# Patient Record
Sex: MALE | Race: WHITE | NOT HISPANIC OR LATINO | Employment: FULL TIME | URBAN - METROPOLITAN AREA
[De-identification: names, ages, dates, MRNs, and addresses within clinical notes are randomized per-mention and may not be internally consistent; named-entity substitution may affect disease eponyms.]

---

## 2017-04-11 DIAGNOSIS — N40.1 ENLARGED PROSTATE WITH LOWER URINARY TRACT SYMPTOMS (LUTS): ICD-10-CM

## 2017-04-25 ENCOUNTER — ALLSCRIPTS OFFICE VISIT (OUTPATIENT)
Dept: OTHER | Facility: OTHER | Age: 72
End: 2017-04-25

## 2018-01-13 VITALS
DIASTOLIC BLOOD PRESSURE: 82 MMHG | HEIGHT: 70 IN | WEIGHT: 210.5 LBS | SYSTOLIC BLOOD PRESSURE: 138 MMHG | BODY MASS INDEX: 30.14 KG/M2 | HEART RATE: 60 BPM

## 2018-04-25 DIAGNOSIS — N40.1 ENLARGED PROSTATE WITH LOWER URINARY TRACT SYMPTOMS (LUTS): ICD-10-CM

## 2018-04-25 DIAGNOSIS — Z12.5 ENCOUNTER FOR SCREENING FOR MALIGNANT NEOPLASM OF PROSTATE: ICD-10-CM

## 2018-04-26 NOTE — PROGRESS NOTES
4/27/2018    Acacia Campos [facility]  1945  [id number]        Assessment  BPH  History of Elevated PSA  History of HGPIN    Discussion  Evonne Jaramillo is a 71 y o  male being managed by Dr La  PSA is 2 03 (4 06 corrected with use of Avodart), previously 1 42 (2 4 corrected with Avodart)  We discussed the rise in PSA and recommend repeat PSA in 6 to 8 weeks  He understands that if his PSA should remain elevated we would then discuss repeat prostate biopsy, MRI of the prostate, and observation  He is otherwise doing well with no urinary complaints  He will continue to take Avodart daily  All questions answered  History of Present Illness  71 y o  male with a history of BPH, elevated PSA, and HGPIN presents today for 1 year follow up  He continues to take Avodart daily  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 No   Incomplete emptying? No   Urinary frequency? No   Urinary urgency? No   Urinary hesitancy? No   Dysuria (painful difficult urination)? No   Nocturia (waking up to use the bathroom)? Yes [1-2 times ]   Straining (having to push to go)? No   Weak stream?  No   Intermittent stream?  No   Post void dribbling? No          Past Medical History  Past Medical History:   Diagnosis Date    BPH (benign prostatic hyperplasia)        Past Surgical History  History reviewed   No pertinent surgical history  Past Family History  Family History   Problem Relation Age of Onset    Diabetes Father     Hypertension Father     Hypertension Mother     Diabetes Mother        Past Social history  Social History     Social History    Marital status: /Civil Union     Spouse name: N/A    Number of children: N/A    Years of education: N/A     Occupational History    Not on file  Social History Main Topics    Smoking status: Never Smoker    Smokeless tobacco: Never Used    Alcohol use Yes      Comment: Social     Drug use: No    Sexual activity: Not on file     Other Topics Concern    Not on file     Social History Narrative    No narrative on file       Current Medications  Current Outpatient Prescriptions   Medication Sig Dispense Refill    aspirin 81 MG tablet Take 1 tablet by mouth      dutasteride (AVODART) 0 5 mg capsule TAKE 1 CAPSULE (0 5 MG TOTAL) BY MOUTH DAILY  3    lisinopril (ZESTRIL) 10 mg tablet Take 10 mg by mouth daily  8    multivitamin (THERAGRAN) TABS Take 1 tablet by mouth       No current facility-administered medications for this visit  Allergies  No Known Allergies    Past Medical History, Social History, Family History, medications and allergies were reviewed  Vitals  Vitals:    04/27/18 0855   BP: 130/78   BP Location: Left arm   Patient Position: Sitting   Cuff Size: Adult   Pulse: 70   Weight: 90 8 kg (200 lb 3 2 oz)   Height: 5' 10" (1 778 m)       Physical Exam  Skin: warm, dry, intact  Cardiac:  Peripheral edema: negative  Pulmonary: Non-labored breathing  Abdomen: Soft, non-tender, non-distended  Musculoskeletal: AROM with no joint deformity or tenderness  Neurology: Alert and oriented    (Male):  COBY: Prostate is enlarged at 45 grams  No nodules noted        Results    Please refer to patient's chart

## 2018-04-27 ENCOUNTER — OFFICE VISIT (OUTPATIENT)
Dept: UROLOGY | Facility: AMBULATORY SURGERY CENTER | Age: 73
End: 2018-04-27
Payer: MEDICARE

## 2018-04-27 VITALS
SYSTOLIC BLOOD PRESSURE: 130 MMHG | HEIGHT: 70 IN | HEART RATE: 70 BPM | WEIGHT: 200.2 LBS | BODY MASS INDEX: 28.66 KG/M2 | DIASTOLIC BLOOD PRESSURE: 78 MMHG

## 2018-04-27 DIAGNOSIS — R97.20 ELEVATED PSA: Primary | ICD-10-CM

## 2018-04-27 PROCEDURE — 99213 OFFICE O/P EST LOW 20 MIN: CPT | Performed by: NURSE PRACTITIONER

## 2018-04-27 RX ORDER — DIPHENOXYLATE HYDROCHLORIDE AND ATROPINE SULFATE 2.5; .025 MG/1; MG/1
1 TABLET ORAL
COMMUNITY

## 2018-04-27 RX ORDER — LISINOPRIL 10 MG/1
10 TABLET ORAL DAILY
Refills: 8 | COMMUNITY
Start: 2018-03-30

## 2018-04-27 RX ORDER — DUTASTERIDE 0.5 MG/1
CAPSULE, LIQUID FILLED ORAL
Refills: 3 | COMMUNITY
Start: 2018-04-06 | End: 2019-05-03 | Stop reason: SDUPTHER

## 2018-06-05 ENCOUNTER — TELEPHONE (OUTPATIENT)
Dept: UROLOGY | Facility: AMBULATORY SURGERY CENTER | Age: 73
End: 2018-06-05

## 2018-06-05 NOTE — TELEPHONE ENCOUNTER
Patient managed by Dr Lluvia Fletcher and seen at the [place] office last month by Lluvia Fletcher, [address]  [facility] called today to inform office that patient will need to repeat his PSA since they were unable to process the specimen provided to them  Left message for patient to call office  He needs to be informed to repeat PSA  Please advise him when he calls back

## 2018-06-06 ENCOUNTER — TELEPHONE (OUTPATIENT)
Dept: UROLOGY | Facility: AMBULATORY SURGERY CENTER | Age: 73
End: 2018-06-06

## 2018-06-07 DIAGNOSIS — R97.20 ELEVATED PSA: Primary | ICD-10-CM

## 2018-06-11 ENCOUNTER — OFFICE VISIT (OUTPATIENT)
Dept: UROLOGY | Facility: AMBULATORY SURGERY CENTER | Age: 73
End: 2018-06-11
Payer: MEDICARE

## 2018-06-11 VITALS
BODY MASS INDEX: 28.35 KG/M2 | DIASTOLIC BLOOD PRESSURE: 88 MMHG | SYSTOLIC BLOOD PRESSURE: 124 MMHG | HEIGHT: 70 IN | HEART RATE: 70 BPM | WEIGHT: 198 LBS

## 2018-06-11 DIAGNOSIS — R97.20 ELEVATED PSA: Primary | ICD-10-CM

## 2018-06-11 PROCEDURE — 99213 OFFICE O/P EST LOW 20 MIN: CPT | Performed by: NURSE PRACTITIONER

## 2018-06-11 RX ORDER — CIPROFLOXACIN 500 MG/1
500 TABLET, FILM COATED ORAL EVERY 12 HOURS SCHEDULED
Qty: 6 TABLET | Refills: 0 | Status: SHIPPED | OUTPATIENT
Start: 2018-06-11 | End: 2018-06-14

## 2018-06-11 NOTE — PROGRESS NOTES
6/11/2018    Marcio Lomas [facility]  1945  [id number]        Assessment  Elevated PSA  BPH  History of HGPIN    Discussion  Jc Morrison is a 71 y o  male being managed by Dr La  His PSA remains elevated at 2 02 (4 04 corrected with use of Avodart)  His PSA last year was 1 42 (2 84 corrected with use of Avodart)  We discussed that since his PSA remains elevated further evaluation is necessary  Options include prostate biopsy versus MRI of the prostate  The patient wishes to proceed with prostate biopsy  This procedure was reviewed with the patient  Pre procedure preparation was reviewed  He will continue to take Avodart daily  All questions answered  History of Present Illness  71 y o  male with a history of BPH, elevated PSA, and HGPIN presents today for follow up  He was recently evaluated and found to have a rise in PSA from 2 4 to 4 06  He returns today with a repeat PSA  He continues to take Avodart daily  He is s/p a prostate biopsy in 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  He denies any changes his lower urinary tract symptoms  He denies any recent illness or infection  Review of Systems  Review of Systems   Constitutional: Negative  HENT: Negative  Respiratory: Negative  Cardiovascular: Negative  Gastrointestinal: Negative  Genitourinary:        As per HPI   Musculoskeletal: Negative  Skin: Negative  Neurological: Negative  Hematological: Negative  AUA SYMPTOM SCORE      Most Recent Value   AUA SYMPTOM SCORE   How often have you had a sensation of not emptying your bladder completely after you finished urinating? 0   How often have you had to urinate again less than two hours after you finished urinating? 1   How often have you found you stopped and started again several times when you urinate?  0   How often have you found it difficult to postpone urination? 0   How often have you had a weak urinary stream?  0   How often have you had to push or strain to begin urination?   0   How many times did you most typically get up to urinate from the time you went to bed at night until the time you got up in the morning? 1   Quality of Life: If you were to spend the rest of your life with your urinary condition just the way it is now, how would you feel about that?  0   AUA SYMPTOM SCORE  2          Past Medical History  Past Medical History:   Diagnosis Date    BPH (benign prostatic hyperplasia)        Past Surgical History  History reviewed  No pertinent surgical history  Past Family History  Family History   Problem Relation Age of Onset    Diabetes Father     Hypertension Father     Hypertension Mother     Diabetes Mother        Past Social history  Social History     Social History    Marital status: /Civil Union     Spouse name: N/A    Number of children: N/A    Years of education: N/A     Occupational History    Not on file  Social History Main Topics    Smoking status: Never Smoker    Smokeless tobacco: Never Used    Alcohol use Yes      Comment: Social     Drug use: No    Sexual activity: Not on file     Other Topics Concern    Not on file     Social History Narrative    No narrative on file       Current Medications  Current Outpatient Prescriptions   Medication Sig Dispense Refill    aspirin 81 MG tablet Take 1 tablet by mouth      dutasteride (AVODART) 0 5 mg capsule TAKE 1 CAPSULE (0 5 MG TOTAL) BY MOUTH DAILY  3    lisinopril (ZESTRIL) 10 mg tablet Take 10 mg by mouth daily  8    multivitamin (THERAGRAN) TABS Take 1 tablet by mouth      Psyllium (METAMUCIL FIBER PO) Take by mouth      ciprofloxacin (CIPRO) 500 mg tablet Take 1 tablet (500 mg total) by mouth every 12 (twelve) hours for 3 days 6 tablet 0     No current facility-administered medications for this visit  Allergies  No Known Allergies    Past Medical History, Social History, Family History, medications and allergies were reviewed and updated as appropriate      Vitals  Vitals: 06/11/18 0821   BP: 124/88   Pulse: 70   Weight: 89 8 kg (198 lb)   Height: 5' 10" (1 778 m)       Physical Exam  Skin: warm, dry, intact  Pulmonary: Non-labored breathing  Abdomen: Soft, non-tender, non-distended  Musculoskeletal: AROM with no joint deformity or tenderness    Neurology: Alert and oriented

## 2018-08-21 NOTE — PROGRESS NOTES
Biopsy prostate     Date/Time 8/24/2018 10:06 AM     Performed by  Shahana Byrd by DELFINA Sanchez       Consent: Verbal consent obtained  Written consent obtained  Consent given by: patient  Patient understanding: patient states understanding of the procedure being performed  Patient consent: the patient's understanding of the procedure matches consent given  Procedure consent: procedure consent matches procedure scheduled  Patient identity confirmed: verbally with patient      Local anesthesia used: yes      Anesthesia: local infiltration     Anesthesia   Local anesthesia used: yes  Local Anesthetic: lidocaine 1% without epinephrine     Sedation   Patient sedated: no      Specimen: yes   Procedure Details   Patient Transportation: confirmed  Patient tolerance: Patient tolerated the procedure well with no immediate complications         71 y o  male with a history of BPH, elevated PSA, and HGPIN presents today for follow up  He was recently evaluated and found to have a rise in PSA from 2 4 to 4 06  He returns today with a repeat PSA  He continues to take Avodart daily  He is s/p a prostate biopsy in 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  He denies any changes his lower urinary tract symptoms  He denies any recent illness or infection  PROCEDURE- US GUIDED PROSTATE BIOPSY    After identification and obtaining informed consent the patient was placed in the left lateral decubitus position in the ultrasound room  He was prepped in the usual fashion  10 cc of 1% viscous lidocaine was administered per rectum  The 7 5 MHz transrectal probe was then introduced  A periprosthetic nerve block was provided by instilling 10 cc of 1% lidocaine via spinal needle into the periprosthetic space bilaterally  Serial images of the prostate were then obtained  Once completed biopsies were retrieved 4 from each peripheral zone and 2 from each central zone for a total of 12 cores   Patient tolerated this well and will follow up in one to 2 weeks for pathology results  PERTINENT FINDINGS AND PLAN        Prostate volume is 78 cc  No significant median lobe component detected  Otherwise heterogeneous appearance with scant calcifications  Patient tolerated the biopsy procedure well

## 2018-08-23 DIAGNOSIS — R97.20 ELEVATED PSA: Primary | ICD-10-CM

## 2018-08-24 ENCOUNTER — PROCEDURE VISIT (OUTPATIENT)
Dept: UROLOGY | Facility: CLINIC | Age: 73
End: 2018-08-24
Payer: MEDICARE

## 2018-08-24 VITALS
SYSTOLIC BLOOD PRESSURE: 140 MMHG | HEART RATE: 70 BPM | HEIGHT: 70 IN | BODY MASS INDEX: 27.66 KG/M2 | WEIGHT: 193.2 LBS | DIASTOLIC BLOOD PRESSURE: 80 MMHG

## 2018-08-24 DIAGNOSIS — R97.20 ELEVATED PSA: Primary | ICD-10-CM

## 2018-08-24 PROCEDURE — 76872 US TRANSRECTAL: CPT | Performed by: UROLOGY

## 2018-08-24 PROCEDURE — G0416 PROSTATE BIOPSY, ANY MTHD: HCPCS | Performed by: PATHOLOGY

## 2018-08-24 PROCEDURE — 55700 PR BIOPSY OF PROSTATE,NEEDLE/PUNCH: CPT | Performed by: UROLOGY

## 2018-08-24 PROCEDURE — 76942 ECHO GUIDE FOR BIOPSY: CPT | Performed by: UROLOGY

## 2018-08-24 RX ORDER — LISINOPRIL 10 MG/1
TABLET ORAL
COMMUNITY
Start: 2018-07-23 | End: 2018-09-17 | Stop reason: SDUPTHER

## 2018-09-14 NOTE — PROGRESS NOTES
9/17/2018    Timmy Headings Upright  1945  [id number]    Discussion and Plan    Prostate biopsies fortunately show no evidence of adenocarcinoma  He will be maintained on Avodart  Return in 6 months with PSA prior to visit  All questions answered at this time    1  Elevated PSA  - PSA Total, Diagnostic; Future    Assessment      Patient Active Problem List   Diagnosis    Elevated PSA       History of Present Illness    Nikky Hanson is a 68 y o  male seen today in regards to a history of BPH, elevated PSA, and HGPIN presents today for follow up  He was recently evaluated and found to have a rise in PSA from 2 4 to 4 06  He returns today with a repeat PSA  He continues to take Avodart daily  He is s/p a prostate biopsy in 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  He denies any changes his lower urinary tract symptoms  He denies any recent illness or infection  He underwent repeat TRUS biopsies of the prostate  Prostate volume was 78 cc  The prostate biopsies fortunately show no evidence of adenocarcinoma  Findings reviewed with patient and wife        Urinary Symptom Assessment        Past Medical History  Past Medical History:   Diagnosis Date    BPH (benign prostatic hyperplasia)        Past Social History  History reviewed  No pertinent surgical history  Past Family History  Family History   Problem Relation Age of Onset    Diabetes Father     Hypertension Father     Hypertension Mother     Diabetes Mother        Past Social history  Social History     Social History    Marital status: /Civil Union     Spouse name: N/A    Number of children: N/A    Years of education: N/A     Occupational History    Not on file       Social History Main Topics    Smoking status: Never Smoker    Smokeless tobacco: Never Used    Alcohol use Yes      Comment: Social     Drug use: No    Sexual activity: Not on file     Other Topics Concern    Not on file     Social History Narrative    No narrative on file       Current Medications  Current Outpatient Prescriptions   Medication Sig Dispense Refill    aspirin 81 MG tablet Take 1 tablet by mouth      dutasteride (AVODART) 0 5 mg capsule TAKE 1 CAPSULE (0 5 MG TOTAL) BY MOUTH DAILY  3    lisinopril (ZESTRIL) 10 mg tablet Take 10 mg by mouth daily  8    multivitamin (THERAGRAN) TABS Take 1 tablet by mouth      Psyllium (METAMUCIL FIBER PO) Take by mouth       No current facility-administered medications for this visit  Allergies  Allergies   Allergen Reactions    Other      Other reaction(s): Unknown       Past Medical History, Social History, Family History, medications and allergies were reviewed  Review of Systems  Review of Systems   Constitutional: Negative  HENT: Negative  Eyes: Negative  Respiratory: Negative  Cardiovascular: Negative  Gastrointestinal: Negative  Endocrine: Negative  Genitourinary: Positive for decreased urine volume  Negative for difficulty urinating, hematuria and urgency  Musculoskeletal: Negative  Skin: Negative  Neurological: Negative  Hematological: Negative  Psychiatric/Behavioral: Negative  Vitals  Vitals:    09/17/18 1058   BP: (!) 150/110   Pulse: 80   Weight: 90 7 kg (200 lb)         Physical Exam    Physical Exam   Constitutional: He is oriented to person, place, and time  He appears well-developed and well-nourished  HENT:   Head: Normocephalic and atraumatic  Eyes: Pupils are equal, round, and reactive to light  Neck: Normal range of motion  Cardiovascular: Normal rate, regular rhythm and normal heart sounds  Pulmonary/Chest: Effort normal and breath sounds normal  No accessory muscle usage  No respiratory distress  Abdominal: Soft  Normal appearance and bowel sounds are normal  There is no tenderness  Musculoskeletal: Normal range of motion  Neurological: He is alert and oriented to person, place, and time  Skin: Skin is warm, dry and intact     Psychiatric: He has a normal mood and affect  His speech is normal  Cognition and memory are normal    Nursing note and vitals reviewed  Results    Below listed labs, pathology results, and radiology images were personally reviewed:    No results found for: PSA  No results found for: GLUCOSE, CALCIUM, NA, K, CO2, CL, BUN, CREATININE  No results found for: WBC, HGB, HCT, MCV, PLT    No results found for this or any previous visit (from the past 1 hour(s)) ]    Case Report   Surgical Pathology Report                         Case: [id number]                                    Authorizing Provider: Franky Neal MD             Collected:           08/24/2018 1023               Ordering Location:     [facility]        Received:            08/24/2018 1023                                      Urology [facility]                                                                Pathologist:           Gayla Bradshaw MD                                                             Specimens:   A) - Prostate, MARLY                                                                                   B) - Prostate, LCZ                                                                                   C) - Prostate, RCZ                                                                                   D) - Prostate, RPZ                                                                         Final Diagnosis   A  Prostate, left peripheral zone core biopsies:  - Benign prostatic tissue     B  Prostate, left central zone core biopsies:  - Benign prostatic tissue     C  Prostate, right central zone core biopsies:  - Benign prostatic tissue     D   Prostate, right peripheral zone core biopsies:  Benign prostatic tissue showing focal chronic inflammation           Electronically signed by Gayla Bradshaw MD on 8/27/2018 at  8:15 AM   Additional Information

## 2018-09-17 ENCOUNTER — OFFICE VISIT (OUTPATIENT)
Dept: UROLOGY | Facility: AMBULATORY SURGERY CENTER | Age: 73
End: 2018-09-17
Payer: MEDICARE

## 2018-09-17 VITALS
BODY MASS INDEX: 28.7 KG/M2 | WEIGHT: 200 LBS | SYSTOLIC BLOOD PRESSURE: 150 MMHG | DIASTOLIC BLOOD PRESSURE: 110 MMHG | HEART RATE: 80 BPM

## 2018-09-17 DIAGNOSIS — R97.20 ELEVATED PSA: Primary | ICD-10-CM

## 2018-09-17 PROCEDURE — 99214 OFFICE O/P EST MOD 30 MIN: CPT | Performed by: UROLOGY

## 2019-04-10 ENCOUNTER — OFFICE VISIT (OUTPATIENT)
Dept: UROLOGY | Facility: AMBULATORY SURGERY CENTER | Age: 74
End: 2019-04-10
Payer: MEDICARE

## 2019-04-10 VITALS
HEART RATE: 98 BPM | DIASTOLIC BLOOD PRESSURE: 74 MMHG | WEIGHT: 205.2 LBS | HEIGHT: 70 IN | BODY MASS INDEX: 29.38 KG/M2 | SYSTOLIC BLOOD PRESSURE: 132 MMHG

## 2019-04-10 DIAGNOSIS — R97.20 ELEVATED PSA: Primary | ICD-10-CM

## 2019-04-10 PROCEDURE — 99214 OFFICE O/P EST MOD 30 MIN: CPT | Performed by: UROLOGY

## 2019-05-03 DIAGNOSIS — N40.1 BENIGN PROSTATIC HYPERPLASIA WITH LOWER URINARY TRACT SYMPTOMS, SYMPTOM DETAILS UNSPECIFIED: ICD-10-CM

## 2019-05-03 DIAGNOSIS — R97.20 ELEVATED PSA: Primary | ICD-10-CM

## 2019-05-06 RX ORDER — DUTASTERIDE 0.5 MG/1
0.5 CAPSULE, LIQUID FILLED ORAL DAILY
Qty: 90 CAPSULE | Refills: 3 | Status: SHIPPED | OUTPATIENT
Start: 2019-05-06 | End: 2020-04-19 | Stop reason: SDUPTHER

## 2020-01-15 ENCOUNTER — HOSPITAL ENCOUNTER (EMERGENCY)
Facility: HOSPITAL | Age: 75
Discharge: HOME | End: 2020-01-15
Attending: EMERGENCY MEDICINE | Admitting: EMERGENCY MEDICINE
Payer: MEDICARE

## 2020-01-15 ENCOUNTER — APPOINTMENT (EMERGENCY)
Dept: CT IMAGING | Facility: HOSPITAL | Age: 75
End: 2020-01-15
Payer: MEDICARE

## 2020-01-15 VITALS
OXYGEN SATURATION: 94 % | WEIGHT: 208.11 LBS | DIASTOLIC BLOOD PRESSURE: 75 MMHG | RESPIRATION RATE: 18 BRPM | BODY MASS INDEX: 29.86 KG/M2 | SYSTOLIC BLOOD PRESSURE: 122 MMHG | TEMPERATURE: 99.8 F | HEART RATE: 121 BPM

## 2020-01-15 DIAGNOSIS — K57.92 DIVERTICULITIS: Primary | ICD-10-CM

## 2020-01-15 LAB
ALBUMIN SERPL BCP-MCNC: 3.8 G/DL (ref 3.5–5)
ALP SERPL-CCNC: 58 U/L (ref 46–116)
ALT SERPL W P-5'-P-CCNC: 23 U/L (ref 12–78)
ANION GAP SERPL CALCULATED.3IONS-SCNC: 9 MMOL/L (ref 4–13)
AST SERPL W P-5'-P-CCNC: 19 U/L (ref 5–45)
BACTERIA UR QL AUTO: ABNORMAL /HPF
BASOPHILS # BLD AUTO: 0.03 THOUSANDS/ΜL (ref 0–0.1)
BASOPHILS NFR BLD AUTO: 0 % (ref 0–1)
BILIRUB SERPL-MCNC: 1.3 MG/DL (ref 0.2–1)
BILIRUB UR QL STRIP: NEGATIVE
BUN SERPL-MCNC: 19 MG/DL (ref 5–25)
CALCIUM SERPL-MCNC: 8.9 MG/DL (ref 8.3–10.1)
CHLORIDE SERPL-SCNC: 100 MMOL/L (ref 100–108)
CLARITY UR: CLEAR
CO2 SERPL-SCNC: 25 MMOL/L (ref 21–32)
COLOR UR: YELLOW
CREAT SERPL-MCNC: 1.34 MG/DL (ref 0.6–1.3)
EOSINOPHIL # BLD AUTO: 0.15 THOUSAND/ΜL (ref 0–0.61)
EOSINOPHIL NFR BLD AUTO: 1 % (ref 0–6)
ERYTHROCYTE [DISTWIDTH] IN BLOOD BY AUTOMATED COUNT: 13.6 % (ref 11.6–15.1)
GFR SERPL CREATININE-BSD FRML MDRD: 52 ML/MIN/1.73SQ M
GLUCOSE SERPL-MCNC: 99 MG/DL (ref 65–140)
GLUCOSE UR STRIP-MCNC: NEGATIVE MG/DL
HCT VFR BLD AUTO: 51.3 % (ref 36.5–49.3)
HGB BLD-MCNC: 16.5 G/DL (ref 12–17)
HGB UR QL STRIP.AUTO: NEGATIVE
HOLD SPECIMEN: NORMAL
IMM GRANULOCYTES # BLD AUTO: 0.08 THOUSAND/UL (ref 0–0.2)
IMM GRANULOCYTES NFR BLD AUTO: 1 % (ref 0–2)
KETONES UR STRIP-MCNC: ABNORMAL MG/DL
LEUKOCYTE ESTERASE UR QL STRIP: NEGATIVE
LIPASE SERPL-CCNC: 102 U/L (ref 73–393)
LYMPHOCYTES # BLD AUTO: 1.63 THOUSANDS/ΜL (ref 0.6–4.47)
LYMPHOCYTES NFR BLD AUTO: 10 % (ref 14–44)
MCH RBC QN AUTO: 30.3 PG (ref 26.8–34.3)
MCHC RBC AUTO-ENTMCNC: 32.2 G/DL (ref 31.4–37.4)
MCV RBC AUTO: 94 FL (ref 82–98)
MONOCYTES # BLD AUTO: 1.15 THOUSAND/ΜL (ref 0.17–1.22)
MONOCYTES NFR BLD AUTO: 7 % (ref 4–12)
MUCOUS THREADS UR QL AUTO: ABNORMAL
NEUTROPHILS # BLD AUTO: 13.31 THOUSANDS/ΜL (ref 1.85–7.62)
NEUTS SEG NFR BLD AUTO: 81 % (ref 43–75)
NITRITE UR QL STRIP: NEGATIVE
NON-SQ EPI CELLS URNS QL MICRO: ABNORMAL /HPF
NRBC BLD AUTO-RTO: 0 /100 WBCS
PH UR STRIP.AUTO: 5.5 [PH]
PLATELET # BLD AUTO: 149 THOUSANDS/UL (ref 149–390)
PMV BLD AUTO: 11.2 FL (ref 8.9–12.7)
POTASSIUM SERPL-SCNC: 4.6 MMOL/L (ref 3.5–5.3)
PROT SERPL-MCNC: 7.9 G/DL (ref 6.4–8.2)
PROT UR STRIP-MCNC: ABNORMAL MG/DL
RBC # BLD AUTO: 5.45 MILLION/UL (ref 3.88–5.62)
RBC #/AREA URNS AUTO: ABNORMAL /HPF
SODIUM SERPL-SCNC: 134 MMOL/L (ref 136–145)
SP GR UR STRIP.AUTO: 1.02 (ref 1–1.03)
UROBILINOGEN UR QL STRIP.AUTO: 0.2 E.U./DL
WBC # BLD AUTO: 16.35 THOUSAND/UL (ref 4.31–10.16)
WBC #/AREA URNS AUTO: ABNORMAL /HPF

## 2020-01-15 PROCEDURE — 80053 COMPREHEN METABOLIC PANEL: CPT | Performed by: EMERGENCY MEDICINE

## 2020-01-15 PROCEDURE — 85025 COMPLETE CBC W/AUTO DIFF WBC: CPT | Performed by: EMERGENCY MEDICINE

## 2020-01-15 PROCEDURE — 99284 EMERGENCY DEPT VISIT MOD MDM: CPT

## 2020-01-15 PROCEDURE — 96360 HYDRATION IV INFUSION INIT: CPT

## 2020-01-15 PROCEDURE — 83690 ASSAY OF LIPASE: CPT | Performed by: EMERGENCY MEDICINE

## 2020-01-15 PROCEDURE — 99284 EMERGENCY DEPT VISIT MOD MDM: CPT | Performed by: EMERGENCY MEDICINE

## 2020-01-15 PROCEDURE — 36415 COLL VENOUS BLD VENIPUNCTURE: CPT

## 2020-01-15 PROCEDURE — 74177 CT ABD & PELVIS W/CONTRAST: CPT

## 2020-01-15 PROCEDURE — 81001 URINALYSIS AUTO W/SCOPE: CPT | Performed by: EMERGENCY MEDICINE

## 2020-01-15 RX ORDER — METOPROLOL SUCCINATE 100 MG/1
TABLET, EXTENDED RELEASE ORAL
COMMUNITY
Start: 2020-01-13

## 2020-01-15 RX ORDER — AMOXICILLIN AND CLAVULANATE POTASSIUM 875; 125 MG/1; MG/1
1 TABLET, FILM COATED ORAL ONCE
Status: COMPLETED | OUTPATIENT
Start: 2020-01-15 | End: 2020-01-15

## 2020-01-15 RX ORDER — AMOXICILLIN AND CLAVULANATE POTASSIUM 875; 125 MG/1; MG/1
1 TABLET, FILM COATED ORAL EVERY 12 HOURS
Qty: 20 TABLET | Refills: 0 | Status: SHIPPED | OUTPATIENT
Start: 2020-01-15 | End: 2020-01-25

## 2020-01-15 RX ADMIN — SODIUM CHLORIDE 1000 ML: 0.9 INJECTION, SOLUTION INTRAVENOUS at 19:24

## 2020-01-15 RX ADMIN — AMOXICILLIN AND CLAVULANATE POTASSIUM 1 TABLET: 875; 125 TABLET, FILM COATED ORAL at 21:18

## 2020-01-15 RX ADMIN — IOHEXOL 100 ML: 350 INJECTION, SOLUTION INTRAVENOUS at 20:21

## 2020-01-16 NOTE — ED PROVIDER NOTES
History  Chief Complaint   Patient presents with    Abdominal Pain     reports LLQ abdmoinal pain x2 days, c/o generalized body aches, pain with walking, -n/v/d/fever  68 yo male with hx of diverticulitis, resection by Dr Dee Kolb 20 years ago and has had no symptoms since that time until monday when he began with LLQ abd pain  No NVD, "not as regular" BM past few days  History provided by:  Patient   used: No    Abdominal Pain   Pain location:  LLQ  Pain quality: aching    Pain radiates to:  Does not radiate  Pain severity:  Moderate  Onset quality:  Gradual  Duration:  3 days  Timing:  Constant  Progression:  Waxing and waning  Chronicity:  New  Relieved by:  Nothing  Worsened by: Movement and palpation  Ineffective treatments:  None tried  Associated symptoms: no anorexia, no chest pain, no chills, no constipation, no diarrhea, no dysuria, no fatigue, no fever, no nausea, no shortness of breath, no sore throat and no vomiting        Prior to Admission Medications   Prescriptions Last Dose Informant Patient Reported? Taking?    Psyllium (METAMUCIL FIBER PO)  Self Yes No   Sig: Take by mouth   dutasteride (AVODART) 0 5 mg capsule   No No   Sig: Take 1 capsule (0 5 mg total) by mouth daily   lisinopril (ZESTRIL) 10 mg tablet  Self Yes No   Sig: Take 10 mg by mouth daily   metoprolol succinate (TOPROL-XL) 100 mg 24 hr tablet   Yes Yes   Sig: TAKE 1 TABLET BY MOUTH EVERY DAY   multivitamin (THERAGRAN) TABS  Self Yes No   Sig: Take 1 tablet by mouth   rivaroxaban (XARELTO) 20 mg tablet   Yes Yes   Sig: TAKE 1 TABLET BY MOUTH EVERY DAY WITH DINNER      Facility-Administered Medications: None       Past Medical History:   Diagnosis Date    BPH (benign prostatic hyperplasia)     Diverticulitis        Past Surgical History:   Procedure Laterality Date    BOWEL RESECTION         Family History   Problem Relation Age of Onset    Diabetes Father     Hypertension Father     Hypertension Mother     Diabetes Mother      I have reviewed and agree with the history as documented  Social History     Tobacco Use    Smoking status: Never Smoker    Smokeless tobacco: Never Used   Substance Use Topics    Alcohol use: Yes     Comment: Social     Drug use: No        Review of Systems   Constitutional: Negative for chills, fatigue and fever  HENT: Negative for congestion and sore throat  Eyes: Negative for visual disturbance  Respiratory: Negative for shortness of breath and wheezing  Cardiovascular: Negative for chest pain and palpitations  Gastrointestinal: Positive for abdominal pain (LLQ)  Negative for anorexia, constipation, diarrhea, nausea and vomiting  Genitourinary: Negative for dysuria  Musculoskeletal: Negative for neck pain and neck stiffness  Skin: Negative for pallor and rash  Neurological: Negative for headaches  Psychiatric/Behavioral: Negative for confusion  All other systems reviewed and are negative  Physical Exam  Physical Exam   Constitutional: He is oriented to person, place, and time  He appears well-developed and well-nourished  No distress  HENT:   Head: Normocephalic and atraumatic  Right Ear: External ear normal    Left Ear: External ear normal    Mouth/Throat: Oropharynx is clear and moist    Eyes: EOM are normal    Neck: Neck supple  Cardiovascular: Regular rhythm  Tachycardia present  No murmur heard  Pulmonary/Chest: Effort normal and breath sounds normal    Abdominal: Soft  Bowel sounds are normal  He exhibits no distension  There is tenderness (moderate LLQ tenderness, mild rest of lower and LUQ) in the left lower quadrant  Musculoskeletal: Normal range of motion  He exhibits no edema  Neurological: He is alert and oriented to person, place, and time  Skin: Skin is warm  No rash noted  No pallor  Psychiatric: He has a normal mood and affect  His behavior is normal    Nursing note and vitals reviewed        Vital Signs  ED Triage Vitals   Temperature Pulse Respirations Blood Pressure SpO2   01/15/20 1714 01/15/20 1714 01/15/20 1714 01/15/20 1716 01/15/20 1716   99 8 °F (37 7 °C) (!) 114 18 131/93 95 %      Temp Source Heart Rate Source Patient Position - Orthostatic VS BP Location FiO2 (%)   01/15/20 1714 01/15/20 1930 01/15/20 1930 01/15/20 1930 --   Oral Monitor Lying Left arm       Pain Score       01/15/20 1714       6           Vitals:    01/15/20 1714 01/15/20 1716 01/15/20 1930 01/15/20 2000   BP:  131/93 122/75 122/75   Pulse: (!) 114  (!) 123 (!) 121   Patient Position - Orthostatic VS:   Lying Lying         Visual Acuity      ED Medications  Medications   sodium chloride 0 9 % bolus 1,000 mL (0 mL Intravenous Stopped 1/15/20 2024)   iohexol (OMNIPAQUE) 350 MG/ML injection (MULTI-DOSE) 100 mL (100 mL Intravenous Given 1/15/20 2021)   amoxicillin-clavulanate (AUGMENTIN) 875-125 mg per tablet 1 tablet (1 tablet Oral Given 1/15/20 2118)       Diagnostic Studies  Results Reviewed     Procedure Component Value Units Date/Time    Urine Microscopic [[id number]]  (Abnormal) Collected:  01/15/20 1924    Lab Status:  Final result Specimen:  Urine, Clean Catch Updated:  01/15/20 1949     RBC, UA None Seen /hpf      WBC, UA 0-1 /hpf      Epithelial Cells None Seen /hpf      Bacteria, UA Occasional /hpf      MUCUS THREADS Moderate    UA w Reflex to Microscopic w Reflex to Culture [[id number]]  (Abnormal) Collected:  01/15/20 1924    Lab Status:  Final result Specimen:  Urine, Clean Catch Updated:  01/15/20 1933     Color, UA Yellow     Clarity, UA Clear     Specific Gravity, UA 1 025     pH, UA 5 5     Leukocytes, UA Negative     Nitrite, UA Negative     Protein, UA Trace mg/dl      Glucose, UA Negative mg/dl      Ketones, UA Trace mg/dl      Urobilinogen, UA 0 2 E U /dl      Bilirubin, UA Negative     Blood, UA Negative    POCT urinalysis dipstick [[id number]]     Lab Status:  No result Specimen:  Urine     Comprehensive metabolic panel [[id number]]  (Abnormal) Collected:  01/15/20 1726    Lab Status:  Final result Specimen:  Blood from Arm, Left Updated:  01/15/20 1748     Sodium 134 mmol/L      Potassium 4 6 mmol/L      Chloride 100 mmol/L      CO2 25 mmol/L      ANION GAP 9 mmol/L      BUN 19 mg/dL      Creatinine 1 34 mg/dL      Glucose 99 mg/dL      Calcium 8 9 mg/dL      AST 19 U/L      ALT 23 U/L      Alkaline Phosphatase 58 U/L      Total Protein 7 9 g/dL      Albumin 3 8 g/dL      Total Bilirubin 1 30 mg/dL      eGFR 52 ml/min/1 73sq m     Narrative:       National Kidney Disease Foundation guidelines for Chronic Kidney Disease (CKD):     Stage 1 with normal or high GFR (GFR > 90 mL/min/1 73 square meters)    Stage 2 Mild CKD (GFR = 60-89 mL/min/1 73 square meters)    Stage 3A Moderate CKD (GFR = 45-59 mL/min/1 73 square meters)    Stage 3B Moderate CKD (GFR = 30-44 mL/min/1 73 square meters)    Stage 4 Severe CKD (GFR = 15-29 mL/min/1 73 square meters)    Stage 5 End Stage CKD (GFR <15 mL/min/1 73 square meters)  Note: GFR calculation is accurate only with a steady state creatinine    Lipase [[id number]]  (Normal) Collected:  01/15/20 1726    Lab Status:  Final result Specimen:  Blood from Arm, Left Updated:  01/15/20 1748     Lipase 102 u/L     CBC and differential [[id number]]  (Abnormal) Collected:  01/15/20 1726    Lab Status:  Final result Specimen:  Blood from Arm, Left Updated:  01/15/20 1731     WBC 16 35 Thousand/uL      RBC 5 45 Million/uL      Hemoglobin 16 5 g/dL      Hematocrit 51 3 %      MCV 94 fL      MCH 30 3 pg      MCHC 32 2 g/dL      RDW 13 6 %      MPV 11 2 fL      Platelets 714 Thousands/uL      nRBC 0 /100 WBCs      Neutrophils Relative 81 %      Immat GRANS % 1 %      Lymphocytes Relative 10 %      Monocytes Relative 7 %      Eosinophils Relative 1 %      Basophils Relative 0 %      Neutrophils Absolute 13 31 Thousands/µL      Immature Grans Absolute 0 08 Thousand/uL      Lymphocytes Absolute 1 63 Thousands/µL Monocytes Absolute 1 15 Thousand/µL      Eosinophils Absolute 0 15 Thousand/µL      Basophils Absolute 0 03 Thousands/µL                  CT abdomen pelvis with contrast   Final Result by Elvin Block MD (01/15 2047)         1  Acute left lower quadrant diverticulitis involving the descending colon  No abscess, perforation, or bowel obstruction  2   Bibasilar atelectasis  Workstation performed: [id number]                    Procedures  Procedures         ED Course  ED Course as of Александр 15 2204   Wed Александр 15, 2020   1900 Pt seen and examined  70 yo male with hx of diverticulitis, resection by Dr Angel Magallanes 20 years ago and has had no symptoms since that time until monday when he began with LLQ abd pain  No NVD, "not as regular" BM past few days  WBC 16 35, creat 1 34 - will give IVF and CT a/p r/o diverticulitis and check urine  2101 CT a/p - 1  Acute left lower quadrant diverticulitis involving the descending colon  No abscess, perforation, or bowel obstruction  2   Bibasilar atelectasis  Identification of Seniors at Risk      Most Recent Value   (ISAR) Identification of Seniors at Risk   Before the illness or injury that brought you to the Emergency, did you need someone to help you on a regular basis? 0 Filed at: 01/15/2020 1716   In the last 24 hours, have you needed more help than usual?  0 Filed at: 01/15/2020 1716   Have you been hospitalized for one or more nights during the past 6 months? 0 Filed at: 01/15/2020 1716   In general, do you see well?  0 Filed at: 01/15/2020 1716   In general, do you have serious problems with your memory?   0 Filed at: 01/15/2020 1716   Do you take more than three different medications every day?  0 Filed at: 01/15/2020 1716   ISAR Score  0 Filed at: 01/15/2020 1716                          MDM      Disposition  Final diagnoses:   Diverticulitis     Time reflects when diagnosis was documented in both MDM as applicable and the Disposition within this note     Time User Action Codes Description Comment    1/15/2020  9:09 PM Michelle Childs Add [K57 92] Diverticulitis       ED Disposition     ED Disposition Condition Date/Time Comment    Discharge Stable Wed Александр 15, 2020  9:09 PM Rod Mohamud Upright discharge to home/self care  Follow-up Information     Follow up With Specialties Details Why Contact Info    Willistine Severance, MD Internal Medicine Schedule an appointment as soon as possible for a visit  As needed [facility] [address]  [phone]            Discharge Medication List as of 1/15/2020  9:13 PM      START taking these medications    Details   amoxicillin-clavulanate (AUGMENTIN) 875-125 mg per tablet Take 1 tablet by mouth every 12 (twelve) hours for 10 days, Starting Wed 1/15/2020, Until Sat 1/25/2020, Print         CONTINUE these medications which have NOT CHANGED    Details   metoprolol succinate (TOPROL-XL) 100 mg 24 hr tablet TAKE 1 TABLET BY MOUTH EVERY DAY, Historical Med      rivaroxaban (XARELTO) 20 mg tablet TAKE 1 TABLET BY MOUTH EVERY DAY WITH DINNER, Historical Med      dutasteride (AVODART) 0 5 mg capsule Take 1 capsule (0 5 mg total) by mouth daily, Starting Mon 5/6/2019, Normal      lisinopril (ZESTRIL) 10 mg tablet Take 10 mg by mouth daily, Starting Fri 3/30/2018, Historical Med      multivitamin (THERAGRAN) TABS Take 1 tablet by mouth, Historical Med      Psyllium (METAMUCIL FIBER PO) Take by mouth, Historical Med           No discharge procedures on file      ED Provider  Electronically Signed by           Carey Gilman DO  01/15/20 5609 No/HCP given and explained.

## 2020-04-09 ENCOUNTER — TELEPHONE (OUTPATIENT)
Dept: UROLOGY | Facility: AMBULATORY SURGERY CENTER | Age: 75
End: 2020-04-09

## 2020-04-19 DIAGNOSIS — N40.1 BENIGN PROSTATIC HYPERPLASIA WITH LOWER URINARY TRACT SYMPTOMS, SYMPTOM DETAILS UNSPECIFIED: ICD-10-CM

## 2020-04-19 DIAGNOSIS — R97.20 ELEVATED PSA: ICD-10-CM

## 2020-04-19 RX ORDER — DUTASTERIDE 0.5 MG/1
0.5 CAPSULE, LIQUID FILLED ORAL DAILY
Qty: 90 CAPSULE | Refills: 3 | Status: SHIPPED | OUTPATIENT
Start: 2020-04-19 | End: 2021-01-18

## 2020-04-20 ENCOUNTER — TELEMEDICINE (OUTPATIENT)
Dept: UROLOGY | Facility: AMBULATORY SURGERY CENTER | Age: 75
End: 2020-04-20
Payer: MEDICARE

## 2020-04-20 DIAGNOSIS — R97.20 ELEVATED PSA: Primary | ICD-10-CM

## 2020-04-20 PROCEDURE — 99214 OFFICE O/P EST MOD 30 MIN: CPT | Performed by: NURSE PRACTITIONER

## 2021-01-18 DIAGNOSIS — N40.1 BENIGN PROSTATIC HYPERPLASIA WITH LOWER URINARY TRACT SYMPTOMS, SYMPTOM DETAILS UNSPECIFIED: ICD-10-CM

## 2021-01-18 DIAGNOSIS — R97.20 ELEVATED PSA: ICD-10-CM

## 2021-01-18 RX ORDER — FINASTERIDE 5 MG/1
TABLET, FILM COATED ORAL
Qty: 90 TABLET | Refills: 0 | Status: SHIPPED | OUTPATIENT
Start: 2021-01-18 | End: 2021-04-16

## 2021-03-05 DIAGNOSIS — Z23 ENCOUNTER FOR IMMUNIZATION: ICD-10-CM

## 2021-04-16 DIAGNOSIS — N40.1 BENIGN PROSTATIC HYPERPLASIA WITH LOWER URINARY TRACT SYMPTOMS, SYMPTOM DETAILS UNSPECIFIED: ICD-10-CM

## 2021-04-16 DIAGNOSIS — R97.20 ELEVATED PSA: ICD-10-CM

## 2021-04-16 RX ORDER — FINASTERIDE 5 MG/1
TABLET, FILM COATED ORAL
Qty: 90 TABLET | Refills: 0 | Status: SHIPPED | OUTPATIENT
Start: 2021-04-16 | End: 2021-04-26 | Stop reason: SDUPTHER

## 2021-04-26 DIAGNOSIS — N40.1 BENIGN PROSTATIC HYPERPLASIA WITH LOWER URINARY TRACT SYMPTOMS, SYMPTOM DETAILS UNSPECIFIED: ICD-10-CM

## 2021-04-26 DIAGNOSIS — R97.20 ELEVATED PSA: ICD-10-CM

## 2021-04-26 RX ORDER — FINASTERIDE 5 MG/1
5 TABLET, FILM COATED ORAL DAILY
Qty: 90 TABLET | Refills: 0 | Status: SHIPPED | OUTPATIENT
Start: 2021-04-26 | End: 2021-06-15 | Stop reason: SDUPTHER

## 2021-04-26 NOTE — TELEPHONE ENCOUNTER
The patient has an upcoming office visit scheduled for 5/17/21 with Brett Boland PA-C in the \[facility]\"" location but will run out of medication until then    Request for same, 90 day supply with NO refills was queued and forwarded to the Advanced Practitioner covering the \[facility]\"" location for approval

## 2021-05-12 RX ORDER — MONTELUKAST SODIUM 10 MG/1
TABLET ORAL
COMMUNITY
Start: 2021-02-06

## 2021-05-12 RX ORDER — ATORVASTATIN CALCIUM 10 MG/1
TABLET, FILM COATED ORAL
COMMUNITY
Start: 2021-03-01

## 2021-05-12 RX ORDER — LISINOPRIL 5 MG/1
5 TABLET ORAL DAILY
COMMUNITY
Start: 2021-02-26

## 2021-06-14 NOTE — PROGRESS NOTES
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  - S/p negative prostate biopsy in 2018  - Most recent PSA was 1 30, 2 6 finasteride corrected  - COBY unremarkable  - Given patient's history of elevated PSA and good functional and health status, will repeat PSA in 1 year  If stable and within normal limits consider discontinuing screening per AUA guidelines  History of Present Illness  Laura Burkett is a 77 y o  male here for follow up evaluation of    BPH and history of elevated PSA  Patient has 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 0   How often have you had to urinate again less than two hours after you finished urinating? 1   How often have you found you stopped and started again several times when you urinate?  0   How often have you found it difficult to postpone urination? 0   How often have you had a weak urinary stream?  0   How often have you had to push or strain to begin urination? 0   How many times did you most typically get up to urinate from the time you went to bed at night until the time you got up in the morning?   1   Quality of Life: If you were to spend the rest of your life with your urinary condition just the way it is now, how would you feel about that?  1   AUA SYMPTOM SCORE  2          Review of Systems   Constitutional: Negative for chills and fever  Respiratory: Negative for shortness of breath  Cardiovascular: Negative for chest pain  Gastrointestinal: Negative for abdominal pain, constipation, diarrhea, nausea and vomiting  Genitourinary: Negative for difficulty urinating, dysuria, flank pain, frequency, hematuria and urgency  Neurological: Negative for dizziness  Past Medical History  Past Medical History:   Diagnosis Date    BPH (benign prostatic hyperplasia)     Diverticulitis        Past Social History  Past Surgical History:   Procedure Laterality Date    BOWEL RESECTION       Social History     Tobacco Use   Smoking Status Never Smoker   Smokeless Tobacco Never Used       Past Family History  Family History   Problem Relation Age of Onset    Diabetes Father     Hypertension Father     Hypertension Mother     Diabetes Mother        Past Social history  Social History     Socioeconomic History    Marital status: /Civil Union     Spouse name: Not on file    Number of children: Not on file    Years of education: Not on file    Highest education level: Not on file   Occupational History    Not on file   Tobacco Use    Smoking status: Never Smoker    Smokeless tobacco: Never Used   Substance and Sexual Activity    Alcohol use: Yes     Comment: Social     Drug use: No    Sexual activity: Not on file   Other Topics Concern    Not on file   Social History Narrative    Not on file     Social Determinants of Health     Financial Resource Strain:     Difficulty of Paying Living Expenses:    Food Insecurity:     Worried About Running Out of Food in the Last Year:     Ran Out of Food in the Last Year:    Transportation Needs:     Lack of Transportation (Medical):      Lack of Transportation (Non-Medical):    Physical Activity:     Days of Exercise per Week:     Minutes of Exercise per Session:    Stress:     Feeling of Stress :    Social Connections:     Frequency of Communication with Friends and Family:     Frequency of Social Gatherings with Friends and Family:     Attends Orthodox Services:     Active Member of Clubs or Organizations:     Attends Club or Organization Meetings:     Marital Status:    Intimate Partner Violence:     Fear of Current or Ex-Partner:     Emotionally Abused:     Physically Abused:     Sexually Abused:        Current Medications  Current Outpatient Medications   Medication Sig Dispense Refill    atorvastatin (LIPITOR) 10 mg tablet TAKE 1 TABLET BY MOUTH EVERY DAY AT NIGHT      finasteride (PROSCAR) 5 mg tablet Take 1 tablet (5 mg total) by mouth daily 90 tablet 0    lisinopril (ZESTRIL) 10 mg tablet Take 10 mg by mouth daily  8    lisinopril (ZESTRIL) 5 mg tablet Take 5 mg by mouth daily      metoprolol succinate (TOPROL-XL) 100 mg 24 hr tablet TAKE 1 TABLET BY MOUTH EVERY DAY      multivitamin (THERAGRAN) TABS Take 1 tablet by mouth      Psyllium (METAMUCIL FIBER PO) Take by mouth      rivaroxaban (XARELTO) 20 mg tablet TAKE 1 TABLET BY MOUTH EVERY DAY WITH DINNER      montelukast (SINGULAIR) 10 mg tablet TAKE 1 TABLET BY MOUTH EVERY DAY AT NIGHT       No current facility-administered medications for this visit  Allergies  No Known Allergies      The following portions of the patient's history were reviewed and updated as appropriate: allergies, current medications, past medical history, past social history, past surgical history and problem list       Vitals  Vitals:    06/15/21 0821   BP: 120/58   BP Location: Left arm   Patient Position: Sitting   Cuff Size: Adult   Pulse: 82   Weight: 92 1 kg (203 lb)   Height: 5' 9" (1 753 m)           Physical Exam  Physical Exam  Constitutional:       Appearance: Normal appearance  HENT:      Head: Normocephalic and atraumatic        Right Ear: External ear normal       Left Ear: External ear normal    Eyes:      General: No scleral icterus  Conjunctiva/sclera: Conjunctivae normal    Cardiovascular:      Pulses: Normal pulses  Pulmonary:      Effort: Pulmonary effort is normal    Genitourinary:     Comments: No prostate nodules or tenderness  Musculoskeletal:         General: Normal range of motion  Cervical back: Normal range of motion  Skin:     General: Skin is warm and dry  Neurological:      General: No focal deficit present  Mental Status: He is alert and oriented to person, place, and time  Psychiatric:         Mood and Affect: Mood normal          Behavior: Behavior normal          Thought Content:  Thought content normal          Judgment: Judgment normal            Results  Recent Results (from the past 1 hour(s))   POCT Measure PVR    Collection Time: 06/15/21  8:28 AM   Result Value Ref Range    POST-VOID RESIDUAL VOLUME, ML POC 17 mL   ]  No results found for: PSA  Lab Results   Component Value Date    CALCIUM 8 9 01/15/2020    K 4 6 01/15/2020    CO2 25 01/15/2020     01/15/2020    BUN 19 01/15/2020    CREATININE 1 34 (H) 01/15/2020     Lab Results   Component Value Date    WBC 16 35 (H) 01/15/2020    HGB 16 5 01/15/2020    HCT 51 3 (H) 01/15/2020    MCV 94 01/15/2020     01/15/2020           Orders  Orders Placed This Encounter   Procedures    POCT Measure PVR       Lynette Pat PA-C

## 2021-06-15 ENCOUNTER — OFFICE VISIT (OUTPATIENT)
Dept: UROLOGY | Facility: CLINIC | Age: 76
End: 2021-06-15
Payer: MEDICARE

## 2021-06-15 VITALS
HEART RATE: 82 BPM | BODY MASS INDEX: 30.07 KG/M2 | WEIGHT: 203 LBS | SYSTOLIC BLOOD PRESSURE: 120 MMHG | DIASTOLIC BLOOD PRESSURE: 58 MMHG | HEIGHT: 69 IN

## 2021-06-15 DIAGNOSIS — R97.20 ELEVATED PSA: ICD-10-CM

## 2021-06-15 DIAGNOSIS — N40.1 BENIGN PROSTATIC HYPERPLASIA WITH LOWER URINARY TRACT SYMPTOMS, SYMPTOM DETAILS UNSPECIFIED: Primary | ICD-10-CM

## 2021-06-15 DIAGNOSIS — Z12.5 SCREENING FOR PROSTATE CANCER: ICD-10-CM

## 2021-06-15 LAB — POST-VOID RESIDUAL VOLUME, ML POC: 17 ML

## 2021-06-15 PROCEDURE — 51798 US URINE CAPACITY MEASURE: CPT | Performed by: PHYSICIAN ASSISTANT

## 2021-06-15 PROCEDURE — 99213 OFFICE O/P EST LOW 20 MIN: CPT | Performed by: PHYSICIAN ASSISTANT

## 2021-06-15 RX ORDER — FINASTERIDE 5 MG/1
5 TABLET, FILM COATED ORAL DAILY
Qty: 90 TABLET | Refills: 0 | Status: SHIPPED | OUTPATIENT
Start: 2021-06-15 | End: 2021-07-22 | Stop reason: SDUPTHER

## 2021-07-22 DIAGNOSIS — N40.1 BENIGN PROSTATIC HYPERPLASIA WITH LOWER URINARY TRACT SYMPTOMS, SYMPTOM DETAILS UNSPECIFIED: ICD-10-CM

## 2021-07-22 RX ORDER — FINASTERIDE 5 MG/1
5 TABLET, FILM COATED ORAL DAILY
Qty: 90 TABLET | Refills: 0 | Status: SHIPPED | OUTPATIENT
Start: 2021-07-22 | End: 2021-10-25 | Stop reason: SDUPTHER

## 2021-10-25 DIAGNOSIS — N40.1 BENIGN PROSTATIC HYPERPLASIA WITH LOWER URINARY TRACT SYMPTOMS, SYMPTOM DETAILS UNSPECIFIED: ICD-10-CM

## 2021-10-26 RX ORDER — FINASTERIDE 5 MG/1
5 TABLET, FILM COATED ORAL DAILY
Qty: 90 TABLET | Refills: 3 | Status: SHIPPED | OUTPATIENT
Start: 2021-10-26

## 2022-06-14 NOTE — PROGRESS NOTES
6/15/2022      Chief Complaint   Patient presents with    Elevated PSA    Benign Prostatic Hypertrophy         Assessment and Plan    77 y o  male     1  History of elevated PSA  - PSA 1 34, stable  - Discussed recommendations to discontinue prostate cancer screening at this time per AUA guidelines    2  BPH with LUTS  - doing well with finasteride monotherapy  - can have this refilled with primary care  - follow-up with Urology on an as-needed basis  - call with any questions or concerns in the meantime  - All questions answered; patient understands and agrees with plan      History of Present Illness  Slim Burkett is a 77 y o  male patient with history of elevated PSA and BPH with lower urinary tract symptoms  here for follow up  Most recent PSA 1 34, stable  Patient doing well overall without any new or worsening symptoms  Patient using finasteride monotherapy without side effects  Overall happy with urination at this time  Denies frequency, urgency, dysuria, gross hematuria, flank pain, suprapubic pain, fevers, chills  Review of Systems   Constitutional: Negative for activity change, appetite change, chills and fever  HENT: Negative for congestion and trouble swallowing  Respiratory: Negative for cough and shortness of breath  Cardiovascular: Negative for chest pain, palpitations and leg swelling  Gastrointestinal: Negative for abdominal pain, constipation, diarrhea, nausea and vomiting  Genitourinary: Negative for difficulty urinating, dysuria, flank pain, frequency, hematuria and urgency  Musculoskeletal: Negative for back pain and gait problem  Skin: Negative for wound  Allergic/Immunologic: Negative for immunocompromised state  Neurological: Negative for dizziness and syncope  Hematological: Does not bruise/bleed easily  Psychiatric/Behavioral: Negative for confusion  All other systems reviewed and are negative        Vitals  Vitals:    06/15/22 1043   BP: 126/88 Pulse: 90   SpO2: 98%   Weight: 90 7 kg (200 lb)   Height: 5' 10" (1 778 m)       Physical Exam  Constitutional:       General: He is not in acute distress  Appearance: Normal appearance  He is not ill-appearing, toxic-appearing or diaphoretic  HENT:      Head: Normocephalic  Nose: No congestion  Eyes:      General: No scleral icterus  Right eye: No discharge  Left eye: No discharge  Conjunctiva/sclera: Conjunctivae normal       Pupils: Pupils are equal, round, and reactive to light  Pulmonary:      Effort: Pulmonary effort is normal    Musculoskeletal:      Cervical back: Normal range of motion  Skin:     General: Skin is warm and dry  Coloration: Skin is not jaundiced or pale  Findings: No bruising, erythema, lesion or rash  Neurological:      General: No focal deficit present  Mental Status: He is alert and oriented to person, place, and time  Mental status is at baseline  Gait: Gait normal    Psychiatric:         Mood and Affect: Mood normal          Behavior: Behavior normal          Thought Content:  Thought content normal          Judgment: Judgment normal            Past History  Past Medical History:   Diagnosis Date    BPH (benign prostatic hyperplasia)     Diverticulitis      Social History     Socioeconomic History    Marital status: /Civil Union     Spouse name: None    Number of children: None    Years of education: None    Highest education level: None   Occupational History    None   Tobacco Use    Smoking status: Never Smoker    Smokeless tobacco: Never Used   Vaping Use    Vaping Use: Never used   Substance and Sexual Activity    Alcohol use: Yes     Comment: Social     Drug use: No    Sexual activity: None   Other Topics Concern    None   Social History Narrative    None     Social Determinants of Health     Financial Resource Strain: Not on file   Food Insecurity: Not on file   Transportation Needs: Not on file Physical Activity: Not on file   Stress: Not on file   Social Connections: Not on file   Intimate Partner Violence: Not on file   Housing Stability: Not on file     Social History     Tobacco Use   Smoking Status Never Smoker   Smokeless Tobacco Never Used     Family History   Problem Relation Age of Onset    Diabetes Father     Hypertension Father     Hypertension Mother     Diabetes Mother        The following portions of the patient's history were reviewed and updated as appropriate: allergies, current medications, past medical history, past social history, past surgical history and problem list     Results  No results found for this or any previous visit (from the past 1 hour(s))  ]  No results found for: PSA  Lab Results   Component Value Date    CALCIUM 8 9 01/15/2020    K 4 6 01/15/2020    CO2 25 01/15/2020     01/15/2020    BUN 19 01/15/2020    CREATININE 1 34 (H) 01/15/2020     Lab Results   Component Value Date    WBC 16 35 (H) 01/15/2020    HGB 16 5 01/15/2020    HCT 51 3 (H) 01/15/2020    MCV 94 01/15/2020     01/15/2020       Karen Bernstein PA-C

## 2022-06-15 ENCOUNTER — OFFICE VISIT (OUTPATIENT)
Dept: UROLOGY | Facility: CLINIC | Age: 77
End: 2022-06-15
Payer: MEDICARE

## 2022-06-15 VITALS
DIASTOLIC BLOOD PRESSURE: 88 MMHG | SYSTOLIC BLOOD PRESSURE: 126 MMHG | WEIGHT: 200 LBS | BODY MASS INDEX: 28.63 KG/M2 | HEIGHT: 70 IN | OXYGEN SATURATION: 98 % | HEART RATE: 90 BPM

## 2022-06-15 DIAGNOSIS — N40.1 BENIGN PROSTATIC HYPERPLASIA WITH LOWER URINARY TRACT SYMPTOMS, SYMPTOM DETAILS UNSPECIFIED: Primary | ICD-10-CM

## 2022-06-15 PROCEDURE — 99213 OFFICE O/P EST LOW 20 MIN: CPT | Performed by: PHYSICIAN ASSISTANT

## 2022-06-15 RX ORDER — AMLODIPINE BESYLATE 2.5 MG/1
TABLET ORAL DAILY
COMMUNITY
Start: 2022-06-08

## 2025-06-29 ENCOUNTER — HOSPITAL ENCOUNTER (INPATIENT)
Facility: HOSPITAL | Age: 80
LOS: 5 days | Discharge: HOME | DRG: 435 | End: 2025-07-04
Attending: EMERGENCY MEDICINE
Payer: MEDICARE

## 2025-06-29 ENCOUNTER — APPOINTMENT (EMERGENCY)
Dept: RADIOLOGY | Facility: HOSPITAL | Age: 80
DRG: 435 | End: 2025-06-29
Payer: MEDICARE

## 2025-06-29 ENCOUNTER — APPOINTMENT (EMERGENCY)
Dept: CT IMAGING | Facility: HOSPITAL | Age: 80
DRG: 435 | End: 2025-06-29
Payer: MEDICARE

## 2025-06-29 DIAGNOSIS — C78.7 METASTATIC LEIOMYOSARCOMA TO LIVER (HCC): ICD-10-CM

## 2025-06-29 DIAGNOSIS — R74.01 TRANSAMINITIS: ICD-10-CM

## 2025-06-29 DIAGNOSIS — R09.02 HYPOXIA: ICD-10-CM

## 2025-06-29 DIAGNOSIS — J90 PLEURAL EFFUSION: ICD-10-CM

## 2025-06-29 DIAGNOSIS — J90 BILATERAL PLEURAL EFFUSION: ICD-10-CM

## 2025-06-29 DIAGNOSIS — C49.9 METASTATIC LEIOMYOSARCOMA TO LIVER (HCC): ICD-10-CM

## 2025-06-29 DIAGNOSIS — I50.9 CHF (CONGESTIVE HEART FAILURE) (HCC): Primary | ICD-10-CM

## 2025-06-29 DIAGNOSIS — N17.9 AKI (ACUTE KIDNEY INJURY) (HCC): ICD-10-CM

## 2025-06-29 DIAGNOSIS — R79.89 ELEVATED TROPONIN: ICD-10-CM

## 2025-06-29 PROBLEM — I48.91 A-FIB (HCC): Status: ACTIVE | Noted: 2025-06-29

## 2025-06-29 PROBLEM — J96.01 ACUTE HYPOXIC RESPIRATORY FAILURE (HCC): Status: ACTIVE | Noted: 2025-06-29

## 2025-06-29 PROBLEM — N40.0 BPH (BENIGN PROSTATIC HYPERPLASIA): Status: ACTIVE | Noted: 2025-06-29

## 2025-06-29 PROBLEM — R06.02 SHORTNESS OF BREATH: Status: ACTIVE | Noted: 2025-06-29

## 2025-06-29 PROBLEM — I10 HYPERTENSION: Status: ACTIVE | Noted: 2025-06-29

## 2025-06-29 LAB
2HR DELTA HS TROPONIN: -6 NG/L
4HR DELTA HS TROPONIN: 11 NG/L
ALBUMIN SERPL BCG-MCNC: 3.8 G/DL (ref 3.5–5)
ALP SERPL-CCNC: 206 U/L (ref 34–104)
ALT SERPL W P-5'-P-CCNC: 364 U/L (ref 7–52)
ANION GAP SERPL CALCULATED.3IONS-SCNC: 8 MMOL/L (ref 4–13)
APTT PPP: 34 SECONDS (ref 23–34)
AST SERPL W P-5'-P-CCNC: 313 U/L (ref 13–39)
BASOPHILS # BLD AUTO: 0.02 THOUSANDS/ÂΜL (ref 0–0.1)
BASOPHILS NFR BLD AUTO: 0 % (ref 0–1)
BILIRUB DIRECT SERPL-MCNC: 0.7 MG/DL (ref 0–0.2)
BILIRUB SERPL-MCNC: 1.75 MG/DL (ref 0.2–1)
BNP SERPL-MCNC: 406 PG/ML (ref 0–100)
BUN SERPL-MCNC: 49 MG/DL (ref 5–25)
CALCIUM SERPL-MCNC: 8.2 MG/DL (ref 8.4–10.2)
CARDIAC TROPONIN I PNL SERPL HS: 73 NG/L (ref ?–50)
CARDIAC TROPONIN I PNL SERPL HS: 79 NG/L (ref ?–50)
CARDIAC TROPONIN I PNL SERPL HS: 90 NG/L (ref ?–50)
CARDIAC TROPONIN I PNL SERPL HS: 95 NG/L (ref ?–50)
CHLORIDE SERPL-SCNC: 102 MMOL/L (ref 96–108)
CO2 SERPL-SCNC: 25 MMOL/L (ref 21–32)
CREAT SERPL-MCNC: 1.89 MG/DL (ref 0.6–1.3)
EOSINOPHIL # BLD AUTO: 0.12 THOUSAND/ÂΜL (ref 0–0.61)
EOSINOPHIL NFR BLD AUTO: 2 % (ref 0–6)
ERYTHROCYTE [DISTWIDTH] IN BLOOD BY AUTOMATED COUNT: 15.9 % (ref 11.6–15.1)
FLUAV AG UPPER RESP QL IA.RAPID: NEGATIVE
FLUBV AG UPPER RESP QL IA.RAPID: NEGATIVE
GFR SERPL CREATININE-BSD FRML MDRD: 33 ML/MIN/1.73SQ M
GLUCOSE SERPL-MCNC: 110 MG/DL (ref 65–140)
HCT VFR BLD AUTO: 39.5 % (ref 36.5–49.3)
HGB BLD-MCNC: 13.1 G/DL (ref 12–17)
IMM GRANULOCYTES # BLD AUTO: 0.04 THOUSAND/UL (ref 0–0.2)
IMM GRANULOCYTES NFR BLD AUTO: 1 % (ref 0–2)
INR PPP: 1.29 (ref 0.85–1.19)
LACTATE SERPL-SCNC: 1.3 MMOL/L (ref 0.5–2)
LYMPHOCYTES # BLD AUTO: 0.82 THOUSANDS/ÂΜL (ref 0.6–4.47)
LYMPHOCYTES NFR BLD AUTO: 11 % (ref 14–44)
MCH RBC QN AUTO: 33.7 PG (ref 26.8–34.3)
MCHC RBC AUTO-ENTMCNC: 33.2 G/DL (ref 31.4–37.4)
MCV RBC AUTO: 102 FL (ref 82–98)
MONOCYTES # BLD AUTO: 0.5 THOUSAND/ÂΜL (ref 0.17–1.22)
MONOCYTES NFR BLD AUTO: 7 % (ref 4–12)
NEUTROPHILS # BLD AUTO: 5.83 THOUSANDS/ÂΜL (ref 1.85–7.62)
NEUTS SEG NFR BLD AUTO: 79 % (ref 43–75)
NRBC BLD AUTO-RTO: 0 /100 WBCS
PLATELET # BLD AUTO: 141 THOUSANDS/UL (ref 149–390)
PMV BLD AUTO: 10.8 FL (ref 8.9–12.7)
POTASSIUM SERPL-SCNC: 5.1 MMOL/L (ref 3.5–5.3)
PROT SERPL-MCNC: 6.2 G/DL (ref 6.4–8.4)
PROTHROMBIN TIME: 16.8 SECONDS (ref 12.3–15)
RBC # BLD AUTO: 3.89 MILLION/UL (ref 3.88–5.62)
SARS-COV+SARS-COV-2 AG RESP QL IA.RAPID: NEGATIVE
SODIUM SERPL-SCNC: 135 MMOL/L (ref 135–147)
TSH SERPL DL<=0.05 MIU/L-ACNC: 2.1 UIU/ML (ref 0.45–4.5)
WBC # BLD AUTO: 7.33 THOUSAND/UL (ref 4.31–10.16)

## 2025-06-29 PROCEDURE — 87804 INFLUENZA ASSAY W/OPTIC: CPT | Performed by: EMERGENCY MEDICINE

## 2025-06-29 PROCEDURE — 36415 COLL VENOUS BLD VENIPUNCTURE: CPT | Performed by: EMERGENCY MEDICINE

## 2025-06-29 PROCEDURE — 87811 SARS-COV-2 COVID19 W/OPTIC: CPT | Performed by: EMERGENCY MEDICINE

## 2025-06-29 PROCEDURE — 84484 ASSAY OF TROPONIN QUANT: CPT

## 2025-06-29 PROCEDURE — 83605 ASSAY OF LACTIC ACID: CPT | Performed by: EMERGENCY MEDICINE

## 2025-06-29 PROCEDURE — 93005 ELECTROCARDIOGRAM TRACING: CPT

## 2025-06-29 PROCEDURE — 85025 COMPLETE CBC W/AUTO DIFF WBC: CPT | Performed by: EMERGENCY MEDICINE

## 2025-06-29 PROCEDURE — 83880 ASSAY OF NATRIURETIC PEPTIDE: CPT | Performed by: EMERGENCY MEDICINE

## 2025-06-29 PROCEDURE — 80048 BASIC METABOLIC PNL TOTAL CA: CPT | Performed by: EMERGENCY MEDICINE

## 2025-06-29 PROCEDURE — 99285 EMERGENCY DEPT VISIT HI MDM: CPT | Performed by: EMERGENCY MEDICINE

## 2025-06-29 PROCEDURE — 99285 EMERGENCY DEPT VISIT HI MDM: CPT

## 2025-06-29 PROCEDURE — 80076 HEPATIC FUNCTION PANEL: CPT | Performed by: EMERGENCY MEDICINE

## 2025-06-29 PROCEDURE — 99223 1ST HOSP IP/OBS HIGH 75: CPT

## 2025-06-29 PROCEDURE — 85730 THROMBOPLASTIN TIME PARTIAL: CPT | Performed by: EMERGENCY MEDICINE

## 2025-06-29 PROCEDURE — 84484 ASSAY OF TROPONIN QUANT: CPT | Performed by: EMERGENCY MEDICINE

## 2025-06-29 PROCEDURE — 71045 X-RAY EXAM CHEST 1 VIEW: CPT

## 2025-06-29 PROCEDURE — 71275 CT ANGIOGRAPHY CHEST: CPT

## 2025-06-29 PROCEDURE — 85610 PROTHROMBIN TIME: CPT | Performed by: EMERGENCY MEDICINE

## 2025-06-29 PROCEDURE — 84443 ASSAY THYROID STIM HORMONE: CPT

## 2025-06-29 RX ORDER — FINASTERIDE 5 MG/1
5 TABLET, FILM COATED ORAL DAILY
Status: DISCONTINUED | OUTPATIENT
Start: 2025-06-29 | End: 2025-07-04 | Stop reason: HOSPADM

## 2025-06-29 RX ORDER — METOPROLOL SUCCINATE 50 MG/1
50 TABLET, EXTENDED RELEASE ORAL DAILY
Status: DISCONTINUED | OUTPATIENT
Start: 2025-06-30 | End: 2025-07-02

## 2025-06-29 RX ORDER — AMLODIPINE BESYLATE 2.5 MG/1
2.5 TABLET ORAL
Status: DISCONTINUED | OUTPATIENT
Start: 2025-06-29 | End: 2025-07-02

## 2025-06-29 RX ORDER — ATORVASTATIN CALCIUM 10 MG/1
10 TABLET, FILM COATED ORAL
Status: DISCONTINUED | OUTPATIENT
Start: 2025-06-29 | End: 2025-07-04 | Stop reason: HOSPADM

## 2025-06-29 RX ORDER — METOPROLOL SUCCINATE 100 MG/1
100 TABLET, EXTENDED RELEASE ORAL
Status: DISCONTINUED | OUTPATIENT
Start: 2025-06-29 | End: 2025-07-02

## 2025-06-29 RX ORDER — FUROSEMIDE 10 MG/ML
40 INJECTION INTRAMUSCULAR; INTRAVENOUS ONCE
Status: COMPLETED | OUTPATIENT
Start: 2025-06-29 | End: 2025-06-29

## 2025-06-29 RX ORDER — METOPROLOL SUCCINATE 50 MG/1
50 TABLET, EXTENDED RELEASE ORAL EVERY EVENING
COMMUNITY
Start: 2025-06-21 | End: 2025-07-08

## 2025-06-29 RX ADMIN — RIVAROXABAN 20 MG: 20 TABLET, FILM COATED ORAL at 17:28

## 2025-06-29 RX ADMIN — METOPROLOL SUCCINATE 100 MG: 100 TABLET, EXTENDED RELEASE ORAL at 21:29

## 2025-06-29 RX ADMIN — FINASTERIDE 5 MG: 5 TABLET, FILM COATED ORAL at 17:28

## 2025-06-29 RX ADMIN — ATORVASTATIN CALCIUM 10 MG: 10 TABLET, FILM COATED ORAL at 17:28

## 2025-06-29 RX ADMIN — AMLODIPINE BESYLATE 2.5 MG: 2.5 TABLET ORAL at 21:29

## 2025-06-29 RX ADMIN — FUROSEMIDE 40 MG: 10 INJECTION, SOLUTION INTRAMUSCULAR; INTRAVENOUS at 17:28

## 2025-06-29 RX ADMIN — IOHEXOL 85 ML: 350 INJECTION, SOLUTION INTRAVENOUS at 12:23

## 2025-06-29 NOTE — ASSESSMENT & PLAN NOTE
Patient creatinine presentation 1.89  Baseline 0.9  Can be side effect of new chemotherapy versus cardiorenal from heart failure

## 2025-06-29 NOTE — ED PROVIDER NOTES
Time reflects when diagnosis was documented in both MDM as applicable and the Disposition within this note       Time User Action Codes Description Comment    6/29/2025  1:12 PM Espinosa, Toro Add [I50.9] CHF (congestive heart failure) (HCC)     6/29/2025  1:12 PM Espinosa, Toro Add [N17.9] PATRICIA (acute kidney injury) (HCC)     6/29/2025  1:13 PM Espinosa, Toro Add [J90] Pleural effusion     6/29/2025  1:13 PM Espinosa, Toro Add [R74.01] Transaminitis     6/29/2025  1:13 PM Espinosa, Toro Add [R79.89] Elevated troponin     6/29/2025  1:13 PM Espinosa, Toro Add [R09.02] Hypoxia           ED Disposition       ED Disposition   Admit    Condition   Stable    Date/Time   Sun Jun 29, 2025  1:12 PM    Comment   Case was discussed with Dr Hernández and the patient's admission status was agreed to be Admission Status: inpatient status to the service of Dr. Hernández .               Assessment & Plan       Medical Decision Making  Problems Addressed:  PATRICIA (acute kidney injury) (HCC): acute illness or injury that poses a threat to life or bodily functions  CHF (congestive heart failure) (HCC): complicated acute illness or injury with systemic symptoms that poses a threat to life or bodily functions  Elevated troponin: complicated acute illness or injury that poses a threat to life or bodily functions  Hypoxia: complicated acute illness or injury with systemic symptoms that poses a threat to life or bodily functions  Pleural effusion: complicated acute illness or injury with systemic symptoms that poses a threat to life or bodily functions  Transaminitis:     Details: C/w congestive hepatopathy.     Amount and/or Complexity of Data Reviewed  Labs: ordered.  Radiology: ordered.    Risk  Prescription drug management.  Decision regarding hospitalization.             Medications   amLODIPine (NORVASC) tablet 2.5 mg (has no administration in time range)   atorvastatin (LIPITOR) tablet 10 mg (has no administration in time range)   finasteride (PROSCAR)  tablet 5 mg (has no administration in time range)   metoprolol succinate (TOPROL-XL) 24 hr tablet 100 mg (has no administration in time range)   metoprolol succinate (TOPROL-XL) 24 hr tablet 50 mg (has no administration in time range)   rivaroxaban (XARELTO) tablet 20 mg (has no administration in time range)   furosemide (LASIX) injection 40 mg (has no administration in time range)   iohexol (OMNIPAQUE) 350 MG/ML injection (MULTI-DOSE) 85 mL (85 mL Intravenous Given 6/29/25 1223)       ED Risk Strat Scores                    (ISAR) Identification of Seniors at Risk  Before the illness or injury that brought you to the Emergency, did you need someone to help you on a regular basis?: 0  In the last 24 hours, have you needed more help than usual?: 0  Have you been hospitalized for one or more nights during the past 6 months?: 0  In general, do you see well?: 0  In general, do you have serious problems with your memory?: 0  Do you take more than three different medications every day?: 0  ISAR Score: 0                                History of Present Illness       Chief Complaint   Patient presents with    Shortness of Breath     States he is on chemo and is c/o increasing SOB.        Past Medical History[1]   Past Surgical History[2]   Family History[3]   Social History[4]   E-Cigarette/Vaping    E-Cigarette Use Never User       E-Cigarette/Vaping Substances    Nicotine No     THC No     CBD No     Flavoring No     Other No     Unknown No       I have reviewed and agree with the history as documented.     78 yo male with increasing dyspnea for the last few days. 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 No cp, hemoptysis, syncope, unilateral leg pain or swelling, fever, or chills. Additional history from wife at bedside as well as review of CTAP from last month without evidence of pleural effusion.         Review of Systems   Respiratory:  Positive for shortness of breath.             Objective       ED Triage Vitals [06/29/25 1116]   Temperature Pulse Blood Pressure Respirations SpO2 Patient Position - Orthostatic VS   97.8 °F (36.6 °C) 77 124/74 22 90 % Sitting      Temp Source Heart Rate Source BP Location FiO2 (%) Pain Score    Temporal Monitor Left arm -- --      Vitals      Date and Time Temp Pulse SpO2 Resp BP Pain Score FACES Pain Rating User   06/29/25 1400 -- 92 92 % 18 142/81 -- -- EN   06/29/25 1300 -- 87 91 % 20 149/86 -- -- EN   06/29/25 1200 -- 90 94 % 20 134/76 -- -- EN   06/29/25 1116 97.8 °F (36.6 °C) 77 90 % 22 124/74 -- -- MS            Physical Exam  Vitals and nursing note reviewed.   Constitutional:       General: He is not in acute distress.     Appearance: He is well-developed. He is not ill-appearing, toxic-appearing or diaphoretic.   HENT:      Head: Normocephalic and atraumatic.      Mouth/Throat:      Mouth: Mucous membranes are moist.      Pharynx: Oropharynx is clear.     Eyes:      Conjunctiva/sclera: Conjunctivae normal.      Pupils: Pupils are equal, round, and reactive to light.     Neck:      Vascular: No JVD.     Cardiovascular:      Rate and Rhythm: Normal rate and regular rhythm.      Pulses: Normal pulses.      Heart sounds: Normal heart sounds. No murmur heard.     No friction rub. No gallop.   Pulmonary:      Effort: Pulmonary effort is normal. No respiratory distress.      Breath sounds: No stridor. Rales present. No wheezing.      Comments: Decreased breath sounds R lung base.   Abdominal:      General: There is no distension.      Palpations: Abdomen is soft.      Tenderness: There is no abdominal tenderness. There is no guarding or rebound.     Musculoskeletal:         General: No swelling, tenderness, deformity or signs of injury. Normal range of motion.      Cervical back: Normal range of motion and neck supple. No rigidity.     Skin:     General: Skin is warm and dry.      Capillary Refill: Capillary refill takes less than 2 seconds.       Coloration: Skin is not jaundiced or pale.      Findings: No bruising or erythema.     Neurological:      General: No focal deficit present.      Mental Status: He is alert and oriented to person, place, and time.      Cranial Nerves: No cranial nerve deficit.      Sensory: No sensory deficit.      Motor: No weakness or abnormal muscle tone.      Coordination: Coordination normal.      Gait: Gait normal.         Results Reviewed       Procedure Component Value Units Date/Time    HS Troponin I 2hr [[id number]]  (Abnormal) Collected: 06/29/25 1441    Lab Status: Final result Specimen: Blood from Arm, Right Updated: 06/29/25 1535     hs TnI 2hr 73 ng/L      Delta 2hr hsTnI -6 ng/L     TSH, 3rd generation [[id number]] Collected: 06/29/25 1441    Lab Status: In process Specimen: Blood from Arm, Right Updated: 06/29/25 1458    APTT [[id number]]  (Normal) Collected: 06/29/25 1151    Lab Status: Final result Specimen: Blood from Arm, Right Updated: 06/29/25 1230     PTT 34 seconds     Protime-INR [[id number]]  (Abnormal) Collected: 06/29/25 1151    Lab Status: Final result Specimen: Blood from Arm, Right Updated: 06/29/25 1230     Protime 16.8 seconds      INR 1.29    Narrative:      INR Therapeutic Range    Indication                                             INR Range      Atrial Fibrillation                                               2.0-3.0  Hypercoagulable State                                    2.0.2.3  Left Ventricular Asist Device                            2.0-3.0  Mechanical Heart Valve                                  -    Aortic(with afib, MI, embolism, HF, LA enlargement,    and/or coagulopathy)                                     2.0-3.0 (2.5-3.5)     Mitral                                                             2.5-3.5  Prosthetic/Bioprosthetic Heart Valve               2.0-3.0  Venous thromboembolism (VTE: VT, PE        2.0-3.0    HS Troponin I 4hr [[id number]]     Lab Status: No result Specimen: Blood      HS Troponin 0hr (reflex protocol) [[id number]]  (Abnormal) Collected: 06/29/25 1151    Lab Status: Final result Specimen: Blood from Arm, Right Updated: 06/29/25 1222     hs TnI 0hr 79 ng/L     B-Type Natriuretic Peptide(BNP) [[id number]]  (Abnormal) Collected: 06/29/25 1151    Lab Status: Final result Specimen: Blood from Arm, Right Updated: 06/29/25 1222      pg/mL     FLU/COVID Rapid Antigen (30 min. TAT) - Preferred screening test in ED [[id number]]  (Normal) Collected: 06/29/25 1140    Lab Status: Final result Specimen: Nares from Nose Updated: 06/29/25 1222     SARS COV Rapid Antigen Negative     Influenza A Rapid Antigen Negative     Influenza B Rapid Antigen Negative    Narrative:      This test has been performed using the [employer] 2 FLU+SARS Antigen test under the Emergency Use Authorization (EUA). This test has been validated by the  and verified by the performing laboratory. The [employer] uses lateral flow immunofluorescent sandwich assay to detect SARS-COV, Influenza A and Influenza B Antigen.     The [employer]ion 2 SARS Antigen test does not differentiate between SARS-CoV and SARS-CoV-2.     Negative results are presumptive and may be confirmed with a molecular assay, if necessary, for patient management. Negative results do not rule out SARS-CoV-2 or influenza infection and should not be used as the sole basis for treatment or patient management decisions. A negative test result may occur if the level of antigen in a sample is below the limit of detection of this test.     Positive results are indicative of the presence of viral antigens, but do not rule out bacterial infection or co-infection with other viruses.     All test results should be used as an adjunct to clinical observations and other information available to the provider.    FOR PEDIATRIC PATIENTS - copy/paste COVID Guidelines URL to browser: [email or web address]     Lactic acid, plasma (w/reflex if result > 2.0) [[id number]]  (Normal) Collected: 06/29/25 1151    Lab Status: Final result Specimen: Blood from Arm, Right Updated: 06/29/25 1216     LACTIC ACID 1.3 mmol/L     Narrative:      Result may be elevated if tourniquet was used during collection.    Basic metabolic panel [[id number]]  (Abnormal) Collected: 06/29/25 1151    Lab Status: Final result Specimen: Blood from Arm, Right Updated: 06/29/25 1216     Sodium 135 mmol/L      Potassium 5.1 mmol/L      Chloride 102 mmol/L      CO2 25 mmol/L      ANION GAP 8 mmol/L      BUN 49 mg/dL      Creatinine 1.89 mg/dL      Glucose 110 mg/dL      Calcium 8.2 mg/dL      eGFR 33 ml/min/1.73sq m     Narrative:      National Kidney Disease Foundation guidelines for Chronic Kidney Disease (CKD):     Stage 1 with normal or high GFR (GFR > 90 mL/min/1.73 square meters)    Stage 2 Mild CKD (GFR = 60-89 mL/min/1.73 square meters)    Stage 3A Moderate CKD (GFR = 45-59 mL/min/1.73 square meters)    Stage 3B Moderate CKD (GFR = 30-44 mL/min/1.73 square meters)    Stage 4 Severe CKD (GFR = 15-29 mL/min/1.73 square meters)    Stage 5 End Stage CKD (GFR <15 mL/min/1.73 square meters)  Note: GFR calculation is accurate only with a steady state creatinine    Hepatic function panel [[id number]]  (Abnormal) Collected: 06/29/25 1151    Lab Status: Final result Specimen: Blood from Arm, Right Updated: 06/29/25 1216     Total Bilirubin 1.75 mg/dL      Bilirubin, Direct 0.70 mg/dL      Alkaline Phosphatase 206 U/L       U/L       U/L      Total Protein 6.2 g/dL      Albumin 3.8 g/dL     CBC and differential [[id number]]  (Abnormal) Collected: 06/29/25 1151    Lab Status: Final result Specimen: Blood from Arm, Right Updated: 06/29/25 1209     WBC 7.33 Thousand/uL      RBC 3.89 Million/uL      Hemoglobin 13.1 g/dL      Hematocrit 39.5 %       fL      MCH 33.7 pg      MCHC 33.2 g/dL      RDW 15.9 %      MPV 10.8 fL      Platelets 141  Thousands/uL      nRBC 0 /100 WBCs      Segmented % 79 %      Immature Grans % 1 %      Lymphocytes % 11 %      Monocytes % 7 %      Eosinophils Relative 2 %      Basophils Relative 0 %      Absolute Neutrophils 5.83 Thousands/µL      Absolute Immature Grans 0.04 Thousand/uL      Absolute Lymphocytes 0.82 Thousands/µL      Absolute Monocytes 0.50 Thousand/µL      Eosinophils Absolute 0.12 Thousand/µL      Basophils Absolute 0.02 Thousands/µL             CTA chest pe study   Final Interpretation by Riki Garcia MD (06/29 1323)      1. No pulmonary embolism.      2.  Bilateral moderate pleural effusions and associated compressive atelectasis of both lower lobes.      3. Pulmonary nodules as above. These are suspicious for metastatic disease given history.      4. There is suggestion of numerous small ill-defined liver lesions suspicious for metastases.         Computerized Assisted Algorithm (CAA) may have aided analysis of applicable images.      Resident: Simon Westfall I, the attending radiologist, have reviewed the images and agree with the final report above.      Workstation performed: [id number]         XR chest 1 view portable    (Results Pending)   IR IN-Patient Thoracentesis    (Results Pending)       ECG 12 Lead Documentation Only    Date/Time: 6/29/2025 11:46 AM    Performed by: Toro Espinosa MD  Authorized by: Toro Espinosa MD    Indications / Diagnosis:  Dyspnea  ECG reviewed by me, the ED Provider: yes    Patient location:  ED  Interpretation:     Interpretation: non-specific    Rate:     ECG rate:  92  Rhythm:     Rhythm: atrial fibrillation    Comments:      Afib. Nnst changes. No rosa or acute ischemic changes.       ED Medication and Procedure Management   Prior to Admission Medications   Prescriptions Last Dose Informant Patient Reported? Taking?   Psyllium (METAMUCIL FIBER PO)  Self Yes No   Sig: Take by mouth   amLODIPine (NORVASC) 2.5 mg tablet  Self Yes No   Sig: daily HAS NOT  STARTED YET   atorvastatin (LIPITOR) 10 mg tablet  Self Yes No   Sig: TAKE 1 TABLET BY MOUTH EVERY DAY AT NIGHT   finasteride (PROSCAR) 5 mg tablet  Self No No   Sig: Take 1 tablet (5 mg total) by mouth daily   lisinopril (ZESTRIL) 10 mg tablet  Self Yes No   Sig: Take 10 mg by mouth daily   Patient not taking: Reported on 6/15/2022   lisinopril (ZESTRIL) 5 mg tablet  Self Yes No   Sig: Take 5 mg by mouth daily   Patient not taking: Reported on 6/15/2022   metoprolol succinate (TOPROL-XL) 100 mg 24 hr tablet  Self Yes No   Sig: TAKE 1 TABLET BY MOUTH EVERY DAY   metoprolol succinate (TOPROL-XL) 50 mg 24 hr tablet   Yes Yes   Sig: Take 50 mg by mouth every evening   montelukast (SINGULAIR) 10 mg tablet  Self Yes No   Sig: TAKE 1 TABLET BY MOUTH EVERY DAY AT NIGHT   multivitamin (THERAGRAN) TABS  Self Yes No   Sig: Take 1 tablet by mouth   rivaroxaban (XARELTO) 20 mg tablet  Self Yes No   Sig: TAKE 1 TABLET BY MOUTH EVERY DAY WITH DINNER      Facility-Administered Medications: None     Patient's Medications   Discharge Prescriptions    No medications on file     No discharge procedures on file.  ED SEPSIS DOCUMENTATION   Time reflects when diagnosis was documented in both MDM as applicable and the Disposition within this note       Time User Action Codes Description Comment    6/29/2025  1:12 PM Espinosa, Toro Add [I50.9] CHF (congestive heart failure) ([facility])     6/29/2025  1:12 PM Espinosa, Toro Add [N17.9] PATRICIA (acute kidney injury) ([facility])     6/29/2025  1:13 PM Espinosa, Toro Add [J90] Pleural effusion     6/29/2025  1:13 PM Espinosa, Toro Add [R74.01] Transaminitis     6/29/2025  1:13 PM Espinosa, Toro Add [R79.89] Elevated troponin     6/29/2025  1:13 PM Espinosa, Toro Add [R09.02] Hypoxia                    [1]   Past Medical History:  Diagnosis Date    BPH (benign prostatic hyperplasia)     Diverticulitis    [2]   Past Surgical History:  Procedure Laterality Date    BOWEL RESECTION     [3]   Family History  Problem Relation  Name Age of Onset    Diabetes Father      Hypertension Father      Hypertension Mother      Diabetes Mother     [4]   Social History  Tobacco Use    Smoking status: Never    Smokeless tobacco: Never   Vaping Use    Vaping status: Never Used   Substance Use Topics    Alcohol use: Yes     Comment: Social     Drug use: No        Toro Espinosa MD  06/29/25 2384

## 2025-06-29 NOTE — ASSESSMENT & PLAN NOTE
Patient with history of A-fib on Xarelto  Patient on metoprolol succinate 50 in the morning and 100 at night  Continue current regimen

## 2025-06-29 NOTE — H&P
H&P - Hospitalist   Name: Hernando Burkett 79 y.o. 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   He has a history of metastatic pleomorphic leiomyosarcoma, initially treated with doxorubicin 6- 8/2023, palliative RT to the spine 10/2023, and on pazopanib from 06/2024-06/2025 when he was recently found to have progression in the liver. He started trabectedin with C1D1 06/24/2025.    Patient reported shortness of breath for the last few days and was getting worse after he got his new chemotherapy.  Labs creatinine 1.89, AST 3013, , troponin 79> 73, , platelet count 141.  CT PE study showed No pulmonary embolism. Bilateral moderate pleural effusions and associated compressive atelectasis of both lower lobes. Pulmonary nodules as above. These are suspicious for metastatic disease given history. There is suggestion of numerous small ill-defined liver lesions suspicious for metastases.  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 He started trabectedin with C1D1 06/24/2025.  CT PE study showed No pulmonary embolism. Bilateral moderate pleural effusions and associated compressive atelectasis of both lower lobes. Pulmonary nodules as above. These are suspicious for metastatic disease given history. There is suggestion of numerous small ill-defined liver lesions suspicious for metastases.  Patient follows up with heme-onc outpatient  Will consult hematology in the setting of multiple organ injury after new chemotherapy  A-fib (HCC)  Patient with history of A-fib on Xarelto  Patient on metoprolol succinate 50 in the morning and 100 at night  Continue current regimen  Bilateral pleural effusion  See management as above  Transaminitis  Patient presented with elevated liver enzymes  AST 3013 and   Might be due to new chemotherapy versus congestive hepatopathy from heart failure  Will consult GI  Continue to monitor  PATRICIA (acute kidney injury) (HCC)  Patient creatinine presentation 1.89  Baseline 0.9  Can be side effect of new chemotherapy versus cardiorenal from heart failure  Hypertension  Continue amlodipine 2.5 and metoprolol succinate  BPH (benign prostatic hyperplasia)  Continue finasteride      VTE Pharmacologic Prophylaxis:   Moderate Risk (Score 3-4) - Pharmacological DVT Prophylaxis Ordered: rivaroxaban (Xarelto).  Code Status: Level 1 - Full Code   Discussion with family: Updated  (wife) at bedside.    Anticipated Length of Stay: Patient will be admitted on an inpatient basis with an anticipated length of stay of greater than 2 midnights secondary to bilateral pleural effusion.    History of Present Illness   Chief Complaint: Shortness of breath    Hernando Burkett is a 79 y.o. 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 He  has a history of metastatic pleomorphic leiomyosarcoma, initially treated with doxorubicin 6- 8/2023, palliative RT to the spine 10/2023, and on pazopanib from 06/2024-06/2025 when he was recently found to have progression in the liver. He started trabectedin with C1D1 06/24/2025.  Patient reported shortness of breath for the last few days and was getting worse.  Vitals in the ED blood pressure 124/74, respiration 22, heart rate 77, temperature 97.8.  Labs creatinine 1.89, AST 3013, , troponin 79> 73, , platelet count 141.  CT PE study showed No pulmonary embolism. Bilateral moderate pleural effusions and associated compressive atelectasis of both lower lobes. Pulmonary nodules as above. These are suspicious for metastatic disease given history. There is suggestion of numerous small ill-defined liver lesions suspicious for metastases.    Review of Systems   Constitutional:  Negative for activity change, chills, diaphoresis, fatigue, fever and unexpected weight change.   HENT:  Negative for congestion, facial swelling, mouth sores, postnasal drip, sinus pressure, tinnitus and trouble swallowing.    Eyes:  Negative for photophobia, redness and itching.   Respiratory:  Positive for shortness of breath. Negative for apnea, cough, choking, chest tightness, wheezing and stridor.    Cardiovascular:  Negative for chest pain, palpitations and leg swelling.   Gastrointestinal:  Negative for abdominal distention, anal bleeding, diarrhea and nausea.   Endocrine: Negative for cold intolerance, heat intolerance and polyuria.   Genitourinary:  Negative for difficulty urinating, enuresis, flank pain, hematuria, penile swelling, scrotal swelling and urgency.   Musculoskeletal:  Negative for arthralgias, back pain, joint swelling, myalgias and neck pain.   Neurological:  Negative for dizziness, syncope, facial asymmetry, speech difficulty, weakness and numbness.   Psychiatric/Behavioral:  Negative for behavioral  problems, confusion and dysphoric mood. The patient is not nervous/anxious and is not hyperactive.        Historical Information   Past Medical History[1]  Past Surgical History[2]  Social History[3]  E-Cigarette/Vaping    E-Cigarette Use Never User      E-Cigarette/Vaping Substances    Nicotine No     THC No     CBD No     Flavoring No     Other No     Unknown No      Family history non-contributory  Social History:  Marital Status: /Civil Union       Meds/Allergies   I have reviewed home medications with patient personally.  Prior to Admission medications    Medication Sig Start Date End Date Taking? Authorizing Provider   amLODIPine (NORVASC) 2.5 mg tablet daily HAS NOT STARTED YET 6/8/22   Historical Provider, MD   atorvastatin (LIPITOR) 10 mg tablet TAKE 1 TABLET BY MOUTH EVERY DAY AT NIGHT 3/1/21   Historical Provider, MD   finasteride (PROSCAR) 5 mg tablet Take 1 tablet (5 mg total) by mouth daily 10/26/21   DARYL Foreman   lisinopril (ZESTRIL) 10 mg tablet Take 10 mg by mouth daily  Patient not taking: Reported on 6/15/2022 3/30/18   Historical Provider, MD   lisinopril (ZESTRIL) 5 mg tablet Take 5 mg by mouth daily  Patient not taking: Reported on 6/15/2022 2/26/21   Historical Provider, MD   metoprolol succinate (TOPROL-XL) 100 mg 24 hr tablet TAKE 1 TABLET BY MOUTH EVERY DAY 1/13/20   Historical Provider, MD   montelukast (SINGULAIR) 10 mg tablet TAKE 1 TABLET BY MOUTH EVERY DAY AT NIGHT 2/6/21   Historical Provider, MD   multivitamin (THERAGRAN) TABS Take 1 tablet by mouth    Historical Provider, MD   Psyllium (METAMUCIL FIBER PO) Take by mouth    Historical Provider, MD   rivaroxaban (XARELTO) 20 mg tablet TAKE 1 TABLET BY MOUTH EVERY DAY WITH DINNER 12/9/19   Historical Provider, MD     No Known Allergies    Objective :  Temp:  [97.4 °F (36.3 °C)-97.8 °F (36.6 °C)] 97.4 °F (36.3 °C)  HR:  [77-95] 95  BP: (124-159)/(74-86) 159/85  Resp:  [16-22] 16  SpO2:  [90 %-94 %] 92 %  O2  Device: Nasal cannula  Nasal Cannula O2 Flow Rate (L/min):  [2 L/min] 2 L/min    Physical Exam  Constitutional:       General: He is not in acute distress.     Appearance: He is not ill-appearing, toxic-appearing or diaphoretic.   HENT:      Head: Normocephalic and atraumatic.      Nose: Nose normal. No congestion or rhinorrhea.      Mouth/Throat:      Mouth: Mucous membranes are moist.      Pharynx: No oropharyngeal exudate or posterior oropharyngeal erythema.     Cardiovascular:      Rate and Rhythm: Normal rate and regular rhythm.      Pulses: Normal pulses.      Heart sounds: No murmur heard.     No friction rub. No gallop.   Pulmonary:      Effort: Pulmonary effort is normal. No respiratory distress.      Breath sounds: No stridor. Rales present. No wheezing or rhonchi.   Abdominal:      General: Abdomen is flat. There is distension.      Palpations: There is no mass.      Tenderness: There is no abdominal tenderness.      Hernia: No hernia is present.     Musculoskeletal:         General: No swelling, tenderness, deformity or signs of injury. Normal range of motion.     Skin:     General: Skin is warm.      Coloration: Skin is not jaundiced or pale.      Findings: No bruising or erythema.     Neurological:      General: No focal deficit present.      Mental Status: He is alert.      Cranial Nerves: No cranial nerve deficit.      Sensory: No sensory deficit.      Motor: No weakness.      Coordination: Coordination normal.     Psychiatric:         Mood and Affect: Mood normal.         Behavior: Behavior normal.         Thought Content: Thought content normal.         Judgment: Judgment normal.                 Lab Results: I have reviewed the following results:  Results from last 7 days   Lab Units 06/29/25  1151   WBC Thousand/uL 7.33   HEMOGLOBIN g/dL 13.1   HEMATOCRIT % 39.5   PLATELETS Thousands/uL 141*   SEGS PCT % 79*   LYMPHO PCT % 11*   MONO PCT % 7   EOS PCT % 2     Results from last 7 days   Lab Units  "06/29/25  1151   SODIUM mmol/L 135   POTASSIUM mmol/L 5.1   CHLORIDE mmol/L 102   CO2 mmol/L 25   BUN mg/dL 49*   CREATININE mg/dL 1.89*   ANION GAP mmol/L 8   CALCIUM mg/dL 8.2*   ALBUMIN g/dL 3.8   TOTAL BILIRUBIN mg/dL 1.75*   ALK PHOS U/L 206*   ALT U/L 364*   AST U/L 313*   GLUCOSE RANDOM mg/dL 110     Results from last 7 days   Lab Units 06/29/25  1151   INR  1.29*         No results found for: \"HGBA1C\"  Results from last 7 days   Lab Units 06/29/25  1151   LACTIC ACID mmol/L 1.3       Imaging Results Review: I reviewed radiology reports from this admission including: CT chest.  Other Study Results Review: EKG was reviewed.     Administrative Statements   I have spent a total time of 60 minutes in caring for this patient on the day of the visit/encounter including Diagnostic results, Prognosis, and Risks and benefits of tx options.    ** Please Note: This note has been constructed using a voice recognition system. **         [1]   Past Medical History:  Diagnosis Date    BPH (benign prostatic hyperplasia)     Diverticulitis    [2]   Past Surgical History:  Procedure Laterality Date    BOWEL RESECTION     [3]   Social History  Tobacco Use    Smoking status: Never    Smokeless tobacco: Never   Vaping Use    Vaping status: Never Used   Substance and Sexual Activity    Alcohol use: Yes     Comment: Social     Drug use: No     "

## 2025-06-29 NOTE — ASSESSMENT & PLAN NOTE
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   He has a history of metastatic pleomorphic leiomyosarcoma, initially treated with doxorubicin 6- 8/2023, palliative RT to the spine 10/2023, and on pazopanib from 06/2024-06/2025 when he was recently found to have progression in the liver. He started trabectedin with C1D1 06/24/2025.    Patient reported shortness of breath for the last few days and was getting worse after he got his new chemotherapy.  Labs creatinine 1.89, AST 3013, , troponin 79> 73, , platelet count 141.  CT PE study showed No pulmonary embolism. Bilateral moderate pleural effusions and associated compressive atelectasis of both lower lobes. Pulmonary nodules as above. These are suspicious for metastatic disease given history. There is suggestion of numerous small ill-defined liver lesions suspicious for metastases.  Plan  Shortness of breath might be in the setting of bilateral pleural effusion due to new chemotherapy versus heart failure  Will start with IV Lasix 40  Consider redosing tomorrow morning  IR thoracentesis is ordered  Continue oxygen and wean when able  Follow-up on echocardiogram  Continue telemetry monitor

## 2025-06-29 NOTE — ASSESSMENT & PLAN NOTE
Patient was hypoxic in the ED  Patient required 2 L of oxygen in the setting of bilateral pleural effusion  Continue to monitor oxygen saturation

## 2025-06-29 NOTE — ASSESSMENT & PLAN NOTE
Patient presented with elevated liver enzymes  AST 3013 and   Might be due to new chemotherapy versus congestive hepatopathy from heart failure  Will consult GI  Continue to monitor

## 2025-06-29 NOTE — ASSESSMENT & PLAN NOTE
He has a history of metastatic pleomorphic leiomyosarcoma, initially treated with doxorubicin 6- 8/2023, palliative RT to the spine 10/2023, and on pazopanib from 06/2024-06/2025 when he was recently found to have progression in the liver. He started trabectedin with C1D1 06/24/2025.  CT PE study showed No pulmonary embolism. Bilateral moderate pleural effusions and associated compressive atelectasis of both lower lobes. Pulmonary nodules as above. These are suspicious for metastatic disease given history. There is suggestion of numerous small ill-defined liver lesions suspicious for metastases.  Patient follows up with heme-onc outpatient  Will consult hematology in the setting of multiple organ injury after new chemotherapy

## 2025-06-30 ENCOUNTER — APPOINTMENT (INPATIENT)
Dept: NON INVASIVE DIAGNOSTICS | Facility: HOSPITAL | Age: 80
DRG: 435 | End: 2025-06-30
Payer: MEDICARE

## 2025-06-30 LAB
2HR DELTA HS TROPONIN: -4 NG/L
4HR DELTA HS TROPONIN: -2 NG/L
ALBUMIN SERPL BCG-MCNC: 3.4 G/DL (ref 3.5–5)
ALP SERPL-CCNC: 183 U/L (ref 34–104)
ALT SERPL W P-5'-P-CCNC: 348 U/L (ref 7–52)
ANION GAP SERPL CALCULATED.3IONS-SCNC: 6 MMOL/L (ref 4–13)
AORTIC ROOT: 3.3 CM
ASCENDING AORTA: 4 CM
AST SERPL W P-5'-P-CCNC: 286 U/L (ref 13–39)
ATRIAL RATE: 107 BPM
ATRIAL RATE: 83 BPM
AV LVOT MEAN GRADIENT: 4 MMHG
AV LVOT PEAK GRADIENT: 6 MMHG
AV REGURGITATION PRESSURE HALF TIME: 351 MS
BASOPHILS # BLD AUTO: 0.02 THOUSANDS/ÂΜL (ref 0–0.1)
BASOPHILS NFR BLD AUTO: 0 % (ref 0–1)
BILIRUB SERPL-MCNC: 1.48 MG/DL (ref 0.2–1)
BSA FOR ECHO PROCEDURE: 2.01 M2
BUN SERPL-MCNC: 43 MG/DL (ref 5–25)
CALCIUM ALBUM COR SERPL-MCNC: 8.1 MG/DL (ref 8.3–10.1)
CALCIUM SERPL-MCNC: 7.6 MG/DL (ref 8.4–10.2)
CARDIAC TROPONIN I PNL SERPL HS: 91 NG/L (ref ?–50)
CARDIAC TROPONIN I PNL SERPL HS: 93 NG/L (ref ?–50)
CHLORIDE SERPL-SCNC: 104 MMOL/L (ref 96–108)
CO2 SERPL-SCNC: 25 MMOL/L (ref 21–32)
CREAT SERPL-MCNC: 1.72 MG/DL (ref 0.6–1.3)
DOP CALC LVOT PEAK VEL VTI: 18.7 CM
DOP CALC LVOT PEAK VEL: 1.2 M/S
E WAVE DECELERATION TIME: 125 MS
EOSINOPHIL # BLD AUTO: 0.16 THOUSAND/ÂΜL (ref 0–0.61)
EOSINOPHIL NFR BLD AUTO: 2 % (ref 0–6)
ERYTHROCYTE [DISTWIDTH] IN BLOOD BY AUTOMATED COUNT: 15.7 % (ref 11.6–15.1)
FRACTIONAL SHORTENING: 34 (ref 28–44)
GFR SERPL CREATININE-BSD FRML MDRD: 37 ML/MIN/1.73SQ M
GLUCOSE SERPL-MCNC: 102 MG/DL (ref 65–140)
HCT VFR BLD AUTO: 36.8 % (ref 36.5–49.3)
HGB BLD-MCNC: 12.4 G/DL (ref 12–17)
IMM GRANULOCYTES # BLD AUTO: 0.07 THOUSAND/UL (ref 0–0.2)
IMM GRANULOCYTES NFR BLD AUTO: 1 % (ref 0–2)
INR PPP: 1.31 (ref 0.85–1.19)
INTERVENTRICULAR SEPTUM IN DIASTOLE (PARASTERNAL SHORT AXIS VIEW): 1.2 CM
INTERVENTRICULAR SEPTUM: 1.4 CM (ref 0.6–1.1)
IVC: 8 MM
LA/AORTA RATIO 2D: 1.33
LAAS-AP2: 26.1 CM2
LAAS-AP4: 20.3 CM2
LEFT ATRIUM SIZE: 4.4 CM
LEFT ATRIUM VOLUME (MOD BIPLANE): 73 ML
LEFT ATRIUM VOLUME INDEX (MOD BIPLANE): 36.3 ML/M2
LEFT INTERNAL DIMENSION IN SYSTOLE: 2.7 CM (ref 2.1–4)
LEFT VENTRICULAR INTERNAL DIMENSION IN DIASTOLE: 4.1 CM (ref 3.5–6)
LEFT VENTRICULAR POSTERIOR WALL IN END DIASTOLE: 1.1 CM
LEFT VENTRICULAR STROKE VOLUME: 46 ML
LV EF US.2D.A4C+ESTIMATED: 54 %
LVSV (TEICH): 46 ML
LYMPHOCYTES # BLD AUTO: 0.76 THOUSANDS/ÂΜL (ref 0.6–4.47)
LYMPHOCYTES NFR BLD AUTO: 12 % (ref 14–44)
MAGNESIUM SERPL-MCNC: 2.3 MG/DL (ref 1.9–2.7)
MCH RBC QN AUTO: 34.2 PG (ref 26.8–34.3)
MCHC RBC AUTO-ENTMCNC: 33.7 G/DL (ref 31.4–37.4)
MCV RBC AUTO: 101 FL (ref 82–98)
MITRAL VALVE REGURGITANT PEAK GRADIENT: 85 MMHG
MONOCYTES # BLD AUTO: 0.47 THOUSAND/ÂΜL (ref 0.17–1.22)
MONOCYTES NFR BLD AUTO: 7 % (ref 4–12)
MV PEAK E VEL: 122 CM/S
MV STENOSIS PRESSURE HALF TIME: 37 MS
MV VALVE AREA P 1/2 METHOD: 5.95
NEUTROPHILS # BLD AUTO: 5.12 THOUSANDS/ÂΜL (ref 1.85–7.62)
NEUTS SEG NFR BLD AUTO: 78 % (ref 43–75)
NRBC BLD AUTO-RTO: 0 /100 WBCS
PHOSPHATE SERPL-MCNC: 3.6 MG/DL (ref 2.3–4.1)
PLATELET # BLD AUTO: 123 THOUSANDS/UL (ref 149–390)
PMV BLD AUTO: 10.7 FL (ref 8.9–12.7)
POTASSIUM SERPL-SCNC: 5 MMOL/L (ref 3.5–5.3)
PROT SERPL-MCNC: 5.4 G/DL (ref 6.4–8.4)
PROTHROMBIN TIME: 17 SECONDS (ref 12.3–15)
PULMONARY REGURGITATION LATE DIASTOLIC VELOCITY: 0.01 M/S
QRS AXIS: 80 DEGREES
QRS AXIS: 85 DEGREES
QRSD INTERVAL: 84 MS
QRSD INTERVAL: 94 MS
QT INTERVAL: 346 MS
QT INTERVAL: 352 MS
QTC INTERVAL: 427 MS
QTC INTERVAL: 435 MS
RA PRESSURE ESTIMATED: 8 MMHG
RBC # BLD AUTO: 3.63 MILLION/UL (ref 3.88–5.62)
RIGHT VENTRICLE ID DIMENSION: 3.2 CM
RV PSP: 37 MMHG
SL CV AV PEAK GRADIENT RETROGRADE: 76 MMHG
SL CV DOP CALC MV VTI RETROGRADE: 120 CM
SL CV LV EF: 55
SL CV MV MEAN GRADIENT RETROGRADE: 64 MMHG
SL CV PED ECHO LEFT VENTRICLE DIASTOLIC VOLUME (MOD BIPLANE) 2D: 74 ML
SL CV PED ECHO LEFT VENTRICLE SYSTOLIC VOLUME (MOD BIPLANE) 2D: 27 ML
SODIUM SERPL-SCNC: 135 MMOL/L (ref 135–147)
T WAVE AXIS: -44 DEGREES
T WAVE AXIS: 5 DEGREES
TR MAX PG: 29 MMHG
TR PEAK VELOCITY: 2.7 M/S
TRICUSPID ANNULAR PLANE SYSTOLIC EXCURSION: 1.6 CM
VENTRICULAR RATE: 92 BPM
VENTRICULAR RATE: 92 BPM
WBC # BLD AUTO: 6.6 THOUSAND/UL (ref 4.31–10.16)

## 2025-06-30 PROCEDURE — 99232 SBSQ HOSP IP/OBS MODERATE 35: CPT | Performed by: STUDENT IN AN ORGANIZED HEALTH CARE EDUCATION/TRAINING PROGRAM

## 2025-06-30 PROCEDURE — 86015 ACTIN ANTIBODY EACH: CPT | Performed by: PHYSICIAN ASSISTANT

## 2025-06-30 PROCEDURE — 84484 ASSAY OF TROPONIN QUANT: CPT

## 2025-06-30 PROCEDURE — 84100 ASSAY OF PHOSPHORUS: CPT

## 2025-06-30 PROCEDURE — 99223 1ST HOSP IP/OBS HIGH 75: CPT | Performed by: INTERNAL MEDICINE

## 2025-06-30 PROCEDURE — 80053 COMPREHEN METABOLIC PANEL: CPT

## 2025-06-30 PROCEDURE — 93306 TTE W/DOPPLER COMPLETE: CPT

## 2025-06-30 PROCEDURE — 83735 ASSAY OF MAGNESIUM: CPT

## 2025-06-30 PROCEDURE — 82390 ASSAY OF CERULOPLASMIN: CPT | Performed by: PHYSICIAN ASSISTANT

## 2025-06-30 PROCEDURE — 86038 ANTINUCLEAR ANTIBODIES: CPT | Performed by: PHYSICIAN ASSISTANT

## 2025-06-30 PROCEDURE — 86381 MITOCHONDRIAL ANTIBODY EACH: CPT | Performed by: PHYSICIAN ASSISTANT

## 2025-06-30 PROCEDURE — 85610 PROTHROMBIN TIME: CPT | Performed by: PHYSICIAN ASSISTANT

## 2025-06-30 PROCEDURE — 86225 DNA ANTIBODY NATIVE: CPT | Performed by: PHYSICIAN ASSISTANT

## 2025-06-30 PROCEDURE — 80074 ACUTE HEPATITIS PANEL: CPT | Performed by: PHYSICIAN ASSISTANT

## 2025-06-30 PROCEDURE — 93306 TTE W/DOPPLER COMPLETE: CPT | Performed by: STUDENT IN AN ORGANIZED HEALTH CARE EDUCATION/TRAINING PROGRAM

## 2025-06-30 PROCEDURE — 85025 COMPLETE CBC W/AUTO DIFF WBC: CPT

## 2025-06-30 PROCEDURE — 93010 ELECTROCARDIOGRAM REPORT: CPT | Performed by: INTERNAL MEDICINE

## 2025-06-30 RX ORDER — FUROSEMIDE 10 MG/ML
40 INJECTION INTRAMUSCULAR; INTRAVENOUS ONCE
Status: COMPLETED | OUTPATIENT
Start: 2025-06-30 | End: 2025-06-30

## 2025-06-30 RX ADMIN — RIVAROXABAN 20 MG: 20 TABLET, FILM COATED ORAL at 17:58

## 2025-06-30 RX ADMIN — AMLODIPINE BESYLATE 2.5 MG: 2.5 TABLET ORAL at 21:12

## 2025-06-30 RX ADMIN — ATORVASTATIN CALCIUM 10 MG: 10 TABLET, FILM COATED ORAL at 17:58

## 2025-06-30 RX ADMIN — FUROSEMIDE 40 MG: 10 INJECTION, SOLUTION INTRAMUSCULAR; INTRAVENOUS at 21:24

## 2025-06-30 RX ADMIN — METOPROLOL SUCCINATE 100 MG: 100 TABLET, EXTENDED RELEASE ORAL at 21:12

## 2025-06-30 RX ADMIN — METOPROLOL SUCCINATE 50 MG: 50 TABLET, EXTENDED RELEASE ORAL at 08:33

## 2025-06-30 NOTE — ASSESSMENT & PLAN NOTE
At baseline, patient has normal LFTs.  Last MRI performed at [facility] was in June 2025 revealing increase size and number of multiple small liver metastasis as compared to exam performed in January 2025.  On admission, LFTs were as follows: , , alkaline phosphatase 206, total bilirubin 1.75.  Liver enzymes slightly decreased today.  INR 1.31.  Platelet count 123,000.  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  According to liver tox database, this has a likelihood score of C meaning probable cause of clinical apparent liver injury, generally in the setting of pre-existing liver disease and high doses.  According to the database, severity of liver injury ranges from mild elevations to clinically apparent liver injury with jaundice and hepatic failure.  -Trend CMP, platelet count and INR daily  -If liver enzymes continue to uptrend and/or INR, would need to consider steroids  -Obtain abdominal ultrasound, patient has PATRICIA therefore would avoid contrasted imaging  -Full liver workup as ordered  -Hepatotoxic drugs  -Avoid hypotension  -Monitor mental status closely  -Left message at [facility] hematology oncology, they will make patient's oncologist aware and likely they were able to see his lab work in Care Everywhere; provided them with our contact information   -Patient is wife agree with plan

## 2025-06-30 NOTE — CONSULTS
Consultation - Gastroenterology   Name: Hernando Burkett 79 y.o. 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  Last MRI performed at [facility] was in June 2025 revealing increase size and number of multiple small liver metastasis as compared to exam performed in January 2025.  On admission, LFTs were as follows: , , alkaline phosphatase 206, total bilirubin 1.75.  Liver enzymes slightly decreased today.  INR 1.31.  Platelet count 123,000.  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  According to liver tox database, this has a likelihood score of C meaning probable cause of clinical apparent liver injury, generally in the setting of pre-existing liver disease and high doses.  According to the database, severity of liver injury ranges from mild elevations to clinically apparent liver injury with jaundice and hepatic failure.  -Trend CMP, platelet count and INR daily  -If liver enzymes continue to uptrend and/or INR, would need to consider steroids  -Obtain abdominal ultrasound, patient has PATRICIA therefore would avoid contrasted imaging  -Full liver workup as ordered  -Hepatotoxic drugs  -Avoid hypotension  -Monitor mental status closely  -Left message at [facility] hematology oncology, they will make patient's oncologist aware and likely they were able to see his lab work in Care Everywhere; provided them with our contact information   -Patient is  wife agree with plan  Patient be seen by Dr. Munroe, medical decision made with Dr. Munroe.    History of Present Illness   HPI:  Hernando Burkett is a 79 y.o. male with history of leiomyosarcoma being treated at [facility] currently also seeing other tertiary centers in the past, atrial fibrillation, hypertension and BPH.  Patient presented to the ER for dyspnea.  Patient reports that he, reports he is actually feeling great afterwards without any side effects.  Patient reports that he even decided to go to the gym, which then led to his progressive dyspnea.  GI was consulted as he was found to have elevated liver enzymes.  At baseline, typically had normal LFTs.  He presented with LFTs elevated as follows: , , alkaline phosphatase 206, total bilirubin 1.75.     CT chest was on admission revealing no pulmonary embolism, bilateral moderate pleural effusions were seen, even though study was not dedicated for the liver there is mention of numerous small ill-defined liver lesions as well.    Patient's wife states that they may be seeking [facility] for a clinical trial in the near future.  Patient reports that he has occasional alcohol, but reports no history of abuse.  Reports no new herbal supplements or other medication.  There is no family history of liver disease to his knowledge.  Patient is a [place]  with [employer].    Review of Systems   Constitutional:  Negative for appetite change, chills, diaphoresis, fatigue and unexpected weight change.   HENT:  Negative for sore throat and trouble swallowing.    Eyes:  Negative for discharge and redness.   Respiratory:  Positive for shortness of breath and wheezing. Negative for cough.    Cardiovascular:  Negative for chest pain and palpitations.   Gastrointestinal:  Positive for abdominal distention. Negative for abdominal pain, anal bleeding, blood in stool, constipation, diarrhea, nausea, rectal pain and vomiting.    Endocrine: Negative for cold intolerance and heat intolerance.   Musculoskeletal:  Negative for joint swelling and myalgias.   Skin:  Negative for pallor and rash.   Neurological:  Negative for dizziness, tremors, weakness, light-headedness, numbness and headaches.   Hematological:  Negative for adenopathy. Does not bruise/bleed easily.   Psychiatric/Behavioral:  Negative for behavioral problems, confusion, dysphoric mood and sleep disturbance. The patient is not nervous/anxious.        Objective :  Temp:  [97.3 °F (36.3 °C)-98.7 °F (37.1 °C)] 98.3 °F (36.8 °C)  HR:  [] 86  BP: ()/(57-85) 110/69  Resp:  [16-18] 16  SpO2:  [87 %-94 %] 92 %  O2 Device: Nasal cannula  Nasal Cannula O2 Flow Rate (L/min):  [2 L/min] 2 L/min    Physical Exam  Constitutional:       General: He is not in acute distress.     Appearance: He is well-developed. He is not diaphoretic.   HENT:      Head: Normocephalic and atraumatic.     Eyes:      General: No scleral icterus.     Conjunctiva/sclera: Conjunctivae normal.      Pupils: Pupils are equal, round, and reactive to light.     Neck:      Thyroid: No thyromegaly.      Trachea: No tracheal deviation.     Cardiovascular:      Rate and Rhythm: Normal rate and regular rhythm.   Pulmonary:      Effort: Pulmonary effort is normal.      Breath sounds: Normal breath sounds. No wheezing or rales.      Comments: 2 L of nasal cannula  Abdominal:      General: Bowel sounds are normal. There is distension.      Palpations: Abdomen is soft. Abdomen is not rigid. There is no mass.      Tenderness: There is no abdominal tenderness. There is no guarding or rebound.     Musculoskeletal:      Cervical back: Neck supple.   Lymphadenopathy:      Cervical: No cervical adenopathy.     Skin:     General: Skin is warm and dry.      Findings: No erythema or rash.     Neurological:      Mental Status: He is alert and oriented to person, place, and time.     Psychiatric:         Behavior: Behavior  normal.         Lab Results: I have reviewed the following results:

## 2025-06-30 NOTE — ASSESSMENT & PLAN NOTE
He has a history of metastatic pleomorphic leiomyosarcoma, initially treated with doxorubicin 6- 8/2023, palliative RT to the spine 10/2023, and on pazopanib from 06/2024-06/2025 when he was recently found to have progression in the liver. He started trabectedin with C1D1 06/24/2025.  CT PE study showed No pulmonary embolism. Bilateral moderate pleural effusions and associated compressive atelectasis of both lower lobes. Pulmonary nodules as above. These are suspicious for metastatic disease given history. There is suggestion of numerous small ill-defined liver lesions suspicious for metastases.  Patient follows up with heme-onc outpatient at [facility].  GI consult.

## 2025-06-30 NOTE — ASSESSMENT & PLAN NOTE
Patient presented with elevated liver enzymes  AST 3013 and   Might be due to new chemotherapy versus congestive hepatopathy from heart failure  Follow-up with GI recommendation.  Continue to monitor

## 2025-06-30 NOTE — ASSESSMENT & PLAN NOTE
Patient was hypoxic in the ED  Patient required 2 L of oxygen in the setting of bilateral pleural effusion  Continue to monitor oxygen saturation  CAT scan report reviewed noted with bilateral moderate pleural effusion associated with compressive atelectasis in both lower lobes.  Responding well to diuretics per  Continue IV Lasix 40 mg daily.

## 2025-06-30 NOTE — ASSESSMENT & PLAN NOTE
At baseline, patient has normal LFTs.  Last MRI performed at [facility] was in June 2025 revealing increase size and number of multiple small liver metastasis as compared to exam performed in January 2025.  On admission, LFTs were as follows: , , alkaline phosphatase 206, total bilirubin 1.75.  Liver enzymes slightly decreased today.  INR 1.31.  Platelet count 123,000.  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  According to liver tox database, this has a likelihood score of C meaning probable cause of clinical apparent liver injury, generally in the setting of pre-existing liver disease and high doses.  According to the database, severity of liver injury ranges from mild elevations to clinically apparent liver injury with jaundice and hepatic failure.  -Trend CMP, platelet count and INR daily  -If liver enzymes continue to uptrend and/or INR, would need to consider steroids  -Obtain abdominal ultrasound, patient has PATRICIA therefore would avoid contrasted imaging  -Full liver workup as ordered  -Hepatotoxic drugs  -Avoid hypotension  -Monitor mental status closely  -Left message at [facility] hematology oncology, they will make patient's oncologist aware and likely they were able to see his lab work in Care Everywhere; provided them with our contact information   -Patient is wife agree with plan

## 2025-06-30 NOTE — ASSESSMENT & PLAN NOTE
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   He has a history of metastatic pleomorphic leiomyosarcoma, initially treated with doxorubicin 6- 8/2023, palliative RT to the spine 10/2023, and on pazopanib from 06/2024-06/2025 when he was recently found to have progression in the liver. He started trabectedin with C1D1 06/24/2025.      Patient reported shortness of breath for the last few days and was getting worse after he got his new chemotherapy.  Labs creatinine 1.89, AST 3013, , troponin 79> 73, , platelet count 141.  Mildly elevated troponin likely due to nonischemic myocardial injury due to volume overload.  CT PE study showed No pulmonary embolism. Bilateral moderate pleural effusions and associated compressive atelectasis of both lower lobes. Pulmonary nodules as above. These are suspicious for metastatic disease given history. There is suggestion of numerous small ill-defined liver lesions suspicious for metastases.    Currently afebrile, ill-appearing however acutely nontoxic appearing.  Currently patient is on IV Lasix and diuresing well.  Reports symptoms improvement with Lasix.  Currently O2 saturation 92 to 93% on room air  Continue IV Lasix 40 mg daily.  Follow-up with repeat chest x-ray tomorrow morning and if not improving significantly considered thoracentesis.  Patient and wife agreeable with above recommendation.  Follow-up with 2D echo.  Monitor urine output.

## 2025-06-30 NOTE — CASE MANAGEMENT
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) 2025    A-fib (HCC) 2025    Bilateral pleural effusion 2025    Transaminitis 2025    PATRICIA (acute kidney injury) (HCC) 2025    Hypertension 2025    BPH (benign prostatic hyperplasia) 2025    Elevated PSA 2018      LOS (days): 1  Geometric Mean LOS (GMLOS) (days):   Days to GMLOS:     OBJECTIVE:    Risk of Unplanned Readmission Score: 19.67         Current admission status: Inpatient       Preferred Pharmacy:   [facility]/pharmacy #[id number] - [place] - [address]  [address]  [place] [address]  Phone: [phone] Fax: [phone]    Primary Care Provider: Misael Rai DO    Primary Insurance: MEDICARE  Secondary Insurance: [facility] BLUE SHIELD    ASSESSMENT:  Active Health Care Proxies       Sima Burkett Health Care Representative - Spouse   Primary Phone: [phone] (Home)                 Advance Directives  Does patient have a Health Care POA?: No  Was patient offered paperwork?: Yes (declined)  Does patient currently have a Health Care decision maker?: Yes, please see Health Care Proxy section  Does patient have Advance Directives?: No  Was patient offered paperwork?: Yes (declined)  Primary Contact: Sima Burkett         Readmission Root Cause  30 Day Readmission: No    Patient Information  Admitted from:: Home  Mental Status: Alert  During Assessment patient was accompanied by: Not accompanied during assessment  Assessment information provided by:: Patient  Primary Caregiver: Self  Support  Systems: Self, Spouse/significant other  County of Residence: [place]  What city do you live in?: [place]  Home entry access options. Select all that apply.: Stairs  Number of steps to enter home.: 2  Do the steps have railings?: Yes  Type of Current Residence: 2 story home  Upon entering residence, is there a bedroom on the main floor (no further steps)?: Yes  Upon entering residence, is there a bathroom on the main floor (no further steps)?: Yes  Living Arrangements: Lives w/ Spouse/significant other  Is patient a ?: Yes  Is patient [facility] with [facility] [facility]: Yes    Activities of Daily Living Prior to Admission  Functional Status: Independent  Completes ADLs independently?: Yes  Ambulates independently?: Yes  Does patient use assisted devices?: No  Does patient currently own DME?: No  Does patient have a history of Outpatient Therapy (PT/OT)?: Yes  Does the patient have a history of Short-Term Rehab?: Yes  Does patient have a history of HHC?: No  Does patient currently have HHC?: No         Patient Information Continued  Income Source: SSI/SSD  Does patient have prescription coverage?: Yes  Can the patient afford their medications and any related supplies (such as glucometers or test strips)?: Yes  Does patient receive dialysis treatments?: No  Does patient have a history of substance abuse?: No  Does patient have a history of Mental Health Diagnosis?: No         Means of Transportation  Means of Transport to Appts:: Drives Self          DISCHARGE DETAILS:    Discharge planning discussed with:: patient  Freedom of Choice: Yes  Comments - Freedom of Choice: cm introduced self/role and discussed FOC with pt who demonstrated understanding and had no questions. IPTA, lives with wife, no DME, and prefers to return home at discharge. No CM needs anticipated  CM contacted family/caregiver?: No- see comments (declined)  Were Treatment Team discharge recommendations reviewed with patient/caregiver?: Yes  Did  patient/caregiver verbalize understanding of patient care needs?: Yes  Were patient/caregiver advised of the risks associated with not following Treatment Team discharge recommendations?: Yes         Requested Home Health Care         Is the patient interested in HHC at discharge?: No    DME Referral Provided  Referral made for DME?: No    Other Referral/Resources/Interventions Provided:  Interventions: Declines resources         Treatment Team Recommendation: Home  Expected Discharge Disposition: Home or Self Care  Additional Discharge Dispositions: Home or Self Care  Transport at Discharge : Family

## 2025-06-30 NOTE — ASSESSMENT & PLAN NOTE
At baseline, patient has normal LFTs.  Last MRI performed at [facility] was in June 2025 revealing increase size and number of multiple small liver metastasis as compared to exam performed in January 2025.  On admission, LFTs were as follows: , , alkaline phosphatase 206, total bilirubin 1.75.  Liver enzymes slightly decreased today.  INR 1.31.  Platelet count 123,000.  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  According to liver tox database, this has a likelihood score of C meaning probable cause of clinical apparent liver injury, generally in the setting of pre-existing liver disease and high doses.  According to the database, severity of liver injury ranges from mild elevations to clinically apparent liver injury with jaundice and hepatic failure.  -Trend CMP, platelet count and INR daily  -If liver enzymes continue to uptrend and/or INR, would need to consider steroids  -Obtain abdominal ultrasound, patient has PATRICIA therefore would avoid contrasted imaging  -Full liver workup as ordered  -Hepatotoxic drugs  -Avoid hypotension  -Monitor mental status closely  -Left message at [facility] hematology oncology, they will make patient's oncologist aware and likely they were able to see his lab work in Care Everywhere; provided them with our contact information   -Patient is wife agree with plan

## 2025-06-30 NOTE — PLAN OF CARE
Problem: PAIN - ADULT  Goal: Verbalizes/displays adequate comfort level or baseline comfort level  Description: Interventions:  - Encourage patient to monitor pain and request assistance  - Assess pain using appropriate pain scale  - Administer analgesics as ordered based on type and severity of pain and evaluate response  - Implement non-pharmacological measures as appropriate and evaluate response  - Consider cultural and social influences on pain and pain management  - Notify physician/advanced practitioner if interventions unsuccessful or patient reports new pain  - Educate patient/family on pain management process including their role and importance of  reporting pain   - Provide non-pharmacologic/complimentary pain relief interventions  Outcome: Progressing     Problem: INFECTION - ADULT  Goal: Absence or prevention of progression during hospitalization  Description: INTERVENTIONS:  - Assess and monitor for signs and symptoms of infection  - Monitor lab/diagnostic results  - Monitor all insertion sites, i.e. indwelling lines, tubes, and drains  - Monitor endotracheal if appropriate and nasal secretions for changes in amount and color  - [place] appropriate cooling/warming therapies per order  - Administer medications as ordered  - Instruct and encourage patient and family to use good hand hygiene technique  - Identify and instruct in appropriate isolation precautions for identified infection/condition  Outcome: Progressing  Goal: Absence of fever/infection during neutropenic period  Description: INTERVENTIONS:  - Monitor WBC  - Perform strict hand hygiene  - Limit to healthy visitors only  - No plants, dried, fresh or silk flowers with lopez in patient room  Outcome: Progressing     Problem: SAFETY ADULT  Goal: Patient will remain free of falls  Description: INTERVENTIONS:  - Educate patient/family on patient safety including physical limitations  - Instruct patient to call for assistance with activity   -  Consider consulting OT/PT to assist with strengthening/mobility based on AM PAC & JH-HLM score  - Consult OT/PT to assist with strengthening/mobility   - Keep Call bell within reach  - Keep bed low and locked with side rails adjusted as appropriate  - Keep care items and personal belongings within reach  - Initiate and maintain comfort rounds  - Make Fall Risk Sign visible to staff  - Offer Toileting every 2 Hours, in advance of need  - Obtain necessary fall risk management equipment: yellow socks  - Apply yellow socks and bracelet for high fall risk patients  - Consider moving patient to room near nurses station  Outcome: Progressing  Goal: Maintain or return to baseline ADL function  Description: INTERVENTIONS:  -  Assess patient's ability to carry out ADLs; assess patient's baseline for ADL function and identify physical deficits which impact ability to perform ADLs (bathing, care of mouth/teeth, toileting, grooming, dressing, etc.)  - Assess/evaluate cause of self-care deficits   - Assess range of motion  - Assess patient's mobility; develop plan if impaired  - Assess patient's need for assistive devices and provide as appropriate  - Encourage maximum independence but intervene and supervise when necessary  - Involve family in performance of ADLs  - Assess for home care needs following discharge   - Consider OT consult to assist with ADL evaluation and planning for discharge  - Provide patient education as appropriate  - Monitor functional capacity and physical performance, use of AM PAC & JH-HLM   - Monitor gait, balance and fatigue with ambulation    Outcome: Progressing  Goal: Maintains/Returns to pre admission functional level  Description: INTERVENTIONS:  - Perform AM-PAC 6 Click Basic Mobility/ Daily Activity assessment daily.  - Set and communicate daily mobility goal to care team and patient/family/caregiver.   - Collaborate with rehabilitation services on mobility goals if consulted  - Perform Range of  Motion 3 times a day.  - Reposition patient every 2 hours.  - Dangle patient 3 times a day  - Stand patient 3 times a day  - Ambulate patient 3 times a day  - Out of bed to chair 3 times a day   - Out of bed for meals 3 times a day  - Out of bed for toileting  - Record patient progress and toleration of activity level   Outcome: Progressing     Problem: DISCHARGE PLANNING  Goal: Discharge to home or other facility with appropriate resources  Description: INTERVENTIONS:  - Identify barriers to discharge w/patient and caregiver  - Arrange for needed discharge resources and transportation as appropriate  - Identify discharge learning needs (meds, wound care, etc.)  - Arrange for interpretive services to assist at discharge as needed  - Refer to Case Management Department for coordinating discharge planning if the patient needs post-hospital services based on physician/advanced practitioner order or complex needs related to functional status, cognitive ability, or social support system  Outcome: Progressing     Problem: Knowledge Deficit  Goal: Patient/family/caregiver demonstrates understanding of disease process, treatment plan, medications, and discharge instructions  Description: Complete learning assessment and assess knowledge base.  Interventions:  - Provide teaching at level of understanding  - Provide teaching via preferred learning methods  Outcome: Progressing

## 2025-06-30 NOTE — PROGRESS NOTES
Progress Note - Hospitalist   Name: Hernando Burkett 79 y.o. 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   He has a history of metastatic pleomorphic leiomyosarcoma, initially treated with doxorubicin 6- 8/2023, palliative RT to the spine 10/2023, and on pazopanib from 06/2024-06/2025 when he was recently found to have progression in the liver. He started trabectedin with C1D1 06/24/2025.      Patient reported shortness of breath for the last few days and was getting worse after he got his new chemotherapy.  Labs creatinine 1.89, AST 3013, , troponin 79> 73, , platelet count 141.  Mildly elevated troponin likely due to nonischemic myocardial injury due to volume overload.  CT PE study showed No pulmonary embolism. Bilateral moderate pleural effusions and associated compressive atelectasis of both lower lobes. Pulmonary nodules as above. These are suspicious for metastatic disease given history. There is suggestion of numerous small ill-defined liver lesions suspicious for metastases.    Currently afebrile, ill-appearing however acutely nontoxic appearing.  Currently patient is on IV Lasix and diuresing well.  Reports symptoms improvement with Lasix.  Currently O2 saturation 92 to 93% on room air  Continue IV Lasix 40 mg daily.  Follow-up with repeat chest x-ray tomorrow morning and if not improving significantly considered thoracentesis.  Patient and wife agreeable with above recommendation.  Follow-up with 2D echo.  Monitor urine output.  Acute hypoxic respiratory failure (HCC)  Patient was hypoxic in the ED  Patient required 2 L of oxygen in the setting of bilateral pleural effusion  Continue to  monitor oxygen saturation  CAT scan report reviewed noted with bilateral moderate pleural effusion associated with compressive atelectasis in both lower lobes.  Responding well to diuretics per  Continue IV Lasix 40 mg daily.  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 He started trabectedin with C1D1 06/24/2025.  CT PE study showed No pulmonary embolism. Bilateral moderate pleural effusions and associated compressive atelectasis of both lower lobes. Pulmonary nodules as above. These are suspicious for metastatic disease given history. There is suggestion of numerous small ill-defined liver lesions suspicious for metastases.  Patient follows up with [facility]on[facility].  GI consult.  A-fib (HCC)  Patient with history of A-fib on Xarelto  Patient on metoprolol succinate 50 in the morning and 100 at night  Continue current regimen  Bilateral pleural effusion  See management as above  Transaminitis  Patient presented with elevated liver enzymes  AST 3013 and   Might be due to new chemotherapy versus congestive hepatopathy from heart failure  Follow-up with GI recommendation.  Continue to monitor  PATRICIA (acute kidney injury) (HCC)  Patient creatinine presentation 1.89  Baseline 0.9  Currently 1.71 improved with IV Lasix.  Can be side effect of new chemotherapy versus cardiorenal from heart failure  Hypertension  Continue amlodipine 2.5 and metoprolol succinate  BPH (benign prostatic hyperplasia)  Continue finasteride    VTE Pharmacologic Prophylaxis:   Moderate Risk (Score 3-4) - Pharmacological DVT Prophylaxis Ordered: rivaroxaban (Xarelto).    Mobility:   Basic Mobility Inpatient Raw Score: 24  JH-HLM Goal: 8: Walk 250 feet or more  JH-HLM Achieved: 8: Walk 250 feet  ot more      Patient Centered Rounds: I performed bedside rounds with nursing staff today.   Discussions with Specialists or Other Care Team Provider:     Education and Discussions with Family / Patient: Updated  (wife) at bedside.    Current Length of Stay: 1 day(s)  Current Patient Status: Inpatient   Certification Statement: The patient will continue to require additional inpatient hospital stay due to pleural effusion, shortness of breath  Discharge Plan: Anticipate discharge in 48 hrs to discharge location to be determined pending rehab evaluations.    Code Status: Level 1 - Full Code    Subjective   Seen during a.m. rounds and patient appears comfortable not in distress.  Reports feeling better after getting IV Lasix yesterday.  Denies chest pain, nausea, vomiting, any other new movements.  No other events reported.    Objective :  Temp:  [97.3 °F (36.3 °C)-98.7 °F (37.1 °C)] 97.7 °F (36.5 °C)  HR:  [] 102  BP: ()/(57-85) 132/84  Resp:  [16-18] 18  SpO2:  [87 %-94 %] 92 %  O2 Device: Nasal cannula  Nasal Cannula O2 Flow Rate (L/min):  [2 L/min] 2 L/min    Body mass index is 27.8 kg/m².     Input and Output Summary (last 24 hours):     Intake/Output Summary (Last 24 hours) at 6/30/2025 1311  Last data filed at 6/29/2025 2229  Gross per 24 hour   Intake 830 ml   Output 825 ml   Net 5 ml       Physical Exam  Constitutional:       General: He is not in acute distress.     Appearance: Normal appearance. He is ill-appearing. He is not toxic-appearing or diaphoretic.     Cardiovascular:      Rate and Rhythm: Normal rate. Rhythm irregular.      Pulses: Normal pulses.   Pulmonary:      Effort: Pulmonary effort is normal. No respiratory distress.      Breath sounds: Rales present. No wheezing.      Comments: O2 saturation 92% on room air.  Abdominal:      General: There is no distension.      Palpations: Abdomen is soft.      Tenderness: There is no abdominal tenderness.     Musculoskeletal:       Right lower leg: No edema.      Left lower leg: No edema.     Neurological:      Mental Status: He is alert and oriented to person, place, and time. Mental status is at baseline.     Psychiatric:         Mood and Affect: Mood normal.         Behavior: Behavior normal.           Lines/Drains:  Lines/Drains/Airways       Active Status       Name Placement date Placement time Site Days    Port A Cath 04/29/23 Right Subclavian 04/29/23  1721  Subclavian  792                           Telemetry:  Telemetry Orders (From admission, onward)               24 Hour Telemetry Monitoring  Continuous x 24 Hours (Telem)        Expiring   Question:  Reason for 24 Hour Telemetry  Answer:  Decompensated CHF- and any one of the following: continuous diuretic infusion or total diuretic dose >200 mg daily, associated electrolyte derangement (I.e. K < 3.0), inotropic drip (continuous infusion), hx of ventricular arrhythmia, or new EF < 35%                     Telemetry Reviewed: A-fib and rate controlled  Indication for Continued Telemetry Use: Acute CHF on >200 mg lasix/day or equivalent dose or with new reduced EF.                Lab Results: I have reviewed the following results:   Results from last 7 days   Lab Units 06/30/25  0534   WBC Thousand/uL 6.60   HEMOGLOBIN g/dL 12.4   HEMATOCRIT % 36.8   PLATELETS Thousands/uL 123*   SEGS PCT % 78*   LYMPHO PCT % 12*   MONO PCT % 7   EOS PCT % 2     Results from last 7 days   Lab Units 06/30/25  0534   SODIUM mmol/L 135   POTASSIUM mmol/L 5.0   CHLORIDE mmol/L 104   CO2 mmol/L 25   BUN mg/dL 43*   CREATININE mg/dL 1.72*   ANION GAP mmol/L 6   CALCIUM mg/dL 7.6*   ALBUMIN g/dL 3.4*   TOTAL BILIRUBIN mg/dL 1.48*   ALK PHOS U/L 183*   ALT U/L 348*   AST U/L 286*   GLUCOSE RANDOM mg/dL 102     Results from last 7 days   Lab Units 06/29/25  1151   INR  1.29*             Results from last 7 days   Lab Units 06/29/25  1151   LACTIC ACID mmol/L 1.3       Recent Cultures (last 7 days):          Imaging Results Review: I reviewed radiology reports from this admission including: CT chest.  Other Study Results Review: EKG was personally reviewed and my interpretation is: A.fib rate controlled...    Last 24 Hours Medication List:     Current Facility-Administered Medications:     amLODIPine (NORVASC) tablet 2.5 mg, HS    atorvastatin (LIPITOR) tablet 10 mg, Daily With Dinner    finasteride (PROSCAR) tablet 5 mg, Daily    metoprolol succinate (TOPROL-XL) 24 hr tablet 100 mg, HS    metoprolol succinate (TOPROL-XL) 24 hr tablet 50 mg, Daily    rivaroxaban (XARELTO) tablet 20 mg, Daily With Dinner    Administrative Statements   Today, Patient Was Seen By: Declan Angel MD  I have spent a total time of   minutes in caring for this patient on the day of the visit/encounter including Diagnostic results, Prognosis, Risks and benefits of tx options, Patient and family education, Impressions, Counseling / Coordination of care, Documenting in the medical record, Reviewing/placing orders in the medical record (including tests, medications, and/or procedures), Obtaining or reviewing history  , and Communicating with other healthcare professionals .    **Please Note: This note may have been constructed using a voice recognition system.**

## 2025-06-30 NOTE — ASSESSMENT & PLAN NOTE
Patient creatinine presentation 1.89  Baseline 0.9  Currently 1.71 improved with IV Lasix.  Can be side effect of new chemotherapy versus cardiorenal from heart failure

## 2025-06-30 NOTE — PLAN OF CARE
Problem: PAIN - ADULT  Goal: Verbalizes/displays adequate comfort level or baseline comfort level  Description: Interventions:  - Encourage patient to monitor pain and request assistance  - Assess pain using appropriate pain scale  - Administer analgesics as ordered based on type and severity of pain and evaluate response  - Implement non-pharmacological measures as appropriate and evaluate response  - Consider cultural and social influences on pain and pain management  - Notify physician/advanced practitioner if interventions unsuccessful or patient reports new pain  - Educate patient/family on pain management process including their role and importance of  reporting pain   - Provide non-pharmacologic/complimentary pain relief interventions  6/30/2025 1625 by Candida Ji  Outcome: Progressing  6/30/2025 1625 by Candida Ji  Outcome: Progressing     Problem: INFECTION - ADULT  Goal: Absence or prevention of progression during hospitalization  Description: INTERVENTIONS:  - Assess and monitor for signs and symptoms of infection  - Monitor lab/diagnostic results  - Monitor all insertion sites, i.e. indwelling lines, tubes, and drains  - Monitor endotracheal if appropriate and nasal secretions for changes in amount and color  - [place] appropriate cooling/warming therapies per order  - Administer medications as ordered  - Instruct and encourage patient and family to use good hand hygiene technique  - Identify and instruct in appropriate isolation precautions for identified infection/condition  6/30/2025 1625 by Candida Ji  Outcome: Progressing  6/30/2025 1625 by Candida Ji  Outcome: Progressing  Goal: Absence of fever/infection during neutropenic period  Description: INTERVENTIONS:  - Monitor WBC  - Perform strict hand hygiene  - Limit to healthy visitors only  - No plants, dried, fresh or silk flowers with lopez in patient room  6/30/2025 1625 by Candida Ji  Outcome: Progressing  6/30/2025 1625 by  Candida Ji  Outcome: Progressing     Problem: SAFETY ADULT  Goal: Patient will remain free of falls  Description: INTERVENTIONS:  - Educate patient/family on patient safety including physical limitations  - Instruct patient to call for assistance with activity   - Consider consulting OT/PT to assist with strengthening/mobility based on AM PAC & JH-HLM score  - Consult OT/PT to assist with strengthening/mobility   - Keep Call bell within reach  - Keep bed low and locked with side rails adjusted as appropriate  - Keep care items and personal belongings within reach  - Initiate and maintain comfort rounds  - Make Fall Risk Sign visible to staff  - Offer Toileting every 2 Hours, in advance of need  - Initiate/Maintain BED alarm  - Obtain necessary fall risk management equipment: call bell within reach   - Apply yellow socks and bracelet for high fall risk patients  - Consider moving patient to room near nurses station  6/30/2025 1625 by Candida Ji  Outcome: Progressing  6/30/2025 1625 by Candida Ji  Outcome: Progressing  Goal: Maintain or return to baseline ADL function  Description: INTERVENTIONS:  -  Assess patient's ability to carry out ADLs; assess patient's baseline for ADL function and identify physical deficits which impact ability to perform ADLs (bathing, care of mouth/teeth, toileting, grooming, dressing, etc.)  - Assess/evaluate cause of self-care deficits   - Assess range of motion  - Assess patient's mobility; develop plan if impaired  - Assess patient's need for assistive devices and provide as appropriate  - Encourage maximum independence but intervene and supervise when necessary  - Involve family in performance of ADLs  - Assess for home care needs following discharge   - Consider OT consult to assist with ADL evaluation and planning for discharge  - Provide patient education as appropriate  - Monitor functional capacity and physical performance, use of AM PAC & JH-HLM   - Monitor gait, balance  and fatigue with ambulation    6/30/2025 1625 by Candida Ji  Outcome: Progressing  6/30/2025 1625 by Candida Ji  Outcome: Progressing  Goal: Maintains/Returns to pre admission functional level  Description: INTERVENTIONS:  - Perform AM-PAC 6 Click Basic Mobility/ Daily Activity assessment daily.  - Set and communicate daily mobility goal to care team and patient/family/caregiver.   - Collaborate with rehabilitation services on mobility goals if consulted  - Perform Range of Motion 3 times a day.  - Reposition patient every 2 hours.  - Dangle patient 3 times a day  - Stand patient 3 times a day  - Ambulate patient 3 times a day  - Out of bed to chair 3 times a day   - Out of bed for meals 3 times a day  - Out of bed for toileting  - Record patient progress and toleration of activity level   6/30/2025 1625 by Candida Ji  Outcome: Progressing  6/30/2025 1625 by Candida Ji  Outcome: Progressing     Problem: DISCHARGE PLANNING  Goal: Discharge to home or other facility with appropriate resources  Description: INTERVENTIONS:  - Identify barriers to discharge w/patient and caregiver  - Arrange for needed discharge resources and transportation as appropriate  - Identify discharge learning needs (meds, wound care, etc.)  - Arrange for interpretive services to assist at discharge as needed  - Refer to Case Management Department for coordinating discharge planning if the patient needs post-hospital services based on physician/advanced practitioner order or complex needs related to functional status, cognitive ability, or social support system  6/30/2025 1625 by Candida Ji  Outcome: Progressing  6/30/2025 1625 by Candida Ji  Outcome: Progressing     Problem: Knowledge Deficit  Goal: Patient/family/caregiver demonstrates understanding of disease process, treatment plan, medications, and discharge instructions  Description: Complete learning assessment and assess knowledge base.  Interventions:  - Provide  teaching at level of understanding  - Provide teaching via preferred learning methods  6/30/2025 1625 by Candida Ji  Outcome: Progressing  6/30/2025 1625 by Candida Ji  Outcome: Progressing     Problem: Decreased Cardiac Output  Goal: Cardiac output adequate for individual needs  Description: INTERVENTIONS: Monitor for signs and symptoms of decreased cardiac output   - Monitor for dyspnea with exertion and at rest  - Monitor for orthopnea  - Monitor for signs of tachycardia. Place patient on telemetry monitoring.  - Assess patient for jugular vein distention  - Assess patient for lower extremity edema and poor peripheral perfusion   - Auscultate lung sound for Fine bibasilar crackles   - Monitor for cardiac arrythmias   - Administer beta blockers, antiarrhythmic, and blood pressure medications as ordered    Outcome: Progressing     Problem: Impaired Gas Exchange  Goal: Optimize oxygenation and ensure adequate ventilation  Description: INTERVENTIONS: Monitor for signs and symptoms of respiratory distress                - Elevate HOB or use high fowlers to promote lung expansion                - Administer oxygen as ordered to maintain adequate oxygenation                - Encourage use of IS to promote lung expansion and prevent PN                - Monitor ABGs to assess oxygenation status                - Monitor blood oxygen level to maintain adequate oxygenation                - Encourage cough and deep breathing exercises to promote lung expansion                - Monitor patient's mental status for increased confusion    Outcome: Progressing     Problem: Excess Fluid Volume  Goal: Patient is able to achieve and maintain homeostasis  Description: INTERVENTIONS: Monitor for sign and symptoms of fluid overload  - Evaluate LE edema every shift  - Elevate LE to prevent dependent edema  - Apply COLLIN stockings as ordered   - Monitor ankle circumference daily  - Assess for jugular vein distention  - Evaluate  provider orders for the CHF diuretic algorithm. Administer diuretics as ordered  - Weigh the patient daily at 0600 and report a weight gain of five pounds or more   - Strict intake and output  - Monitor fluid intake and adhere to fluid restrictions  - Assess lung sounds every shift and as needed  - Monitor vital signs and lab values (CBC, chem, BUN, BNP)  - Measure and document urine output    Outcome: Progressing     Problem: Activity Intolerance  Goal: Patient is able to perform activities within their limitations  Description: INTERVENTIONS:                       -   Alternate periods of activity with periods of rest                 -   Patients is able to maintain normal vitals heart rhythm during activity                 -   Gradually increase activity and exercise as patient can tolerate                 -   Monitor blood pressure and heart before and after exercise                  -   Monitor blood oxygen saturation during activity and apply oxygen as needed    Outcome: Progressing     Problem: Knowledge Deficit  Goal: Patient is able to verbalize understanding of Heart Failure after education  Description: INTERVENTIONS:  - Educate the patient and family on signs and symptoms of HF  - Provide the patient with HF education and HF zone tool  - Educate on the importance of daily weight in the AM and reporting a weight gain               of 3 or more pounds to their primary care physician  - Monitor for SOB  - Maintain and sodium and fluid restriction  - Educate the patient on the importance of medications such as: diuretics, betablockers,               antiarrhythmics and their purpose, dose, route, side effects and labs               if they are needed    Outcome: Progressing

## 2025-07-01 ENCOUNTER — APPOINTMENT (INPATIENT)
Dept: RADIOLOGY | Facility: HOSPITAL | Age: 80
DRG: 435 | End: 2025-07-01
Payer: MEDICARE

## 2025-07-01 ENCOUNTER — APPOINTMENT (INPATIENT)
Dept: ULTRASOUND IMAGING | Facility: HOSPITAL | Age: 80
DRG: 435 | End: 2025-07-01
Payer: MEDICARE

## 2025-07-01 ENCOUNTER — TELEPHONE (OUTPATIENT)
Age: 80
End: 2025-07-01

## 2025-07-01 LAB
ACTIN IGG SERPL-ACNC: 2 UNITS (ref 0–19)
ALBUMIN SERPL BCG-MCNC: 3.5 G/DL (ref 3.5–5)
ALP SERPL-CCNC: 218 U/L (ref 34–104)
ALT SERPL W P-5'-P-CCNC: 343 U/L (ref 7–52)
ANION GAP SERPL CALCULATED.3IONS-SCNC: 6 MMOL/L (ref 4–13)
AST SERPL W P-5'-P-CCNC: 257 U/L (ref 13–39)
BILIRUB SERPL-MCNC: 1.37 MG/DL (ref 0.2–1)
BUN SERPL-MCNC: 38 MG/DL (ref 5–25)
CALCIUM SERPL-MCNC: 7.8 MG/DL (ref 8.4–10.2)
CERULOPLASMIN SERPL-MCNC: 28.6 MG/DL (ref 16–31)
CHLORIDE SERPL-SCNC: 104 MMOL/L (ref 96–108)
CO2 SERPL-SCNC: 25 MMOL/L (ref 21–32)
CREAT SERPL-MCNC: 1.59 MG/DL (ref 0.6–1.3)
ERYTHROCYTE [DISTWIDTH] IN BLOOD BY AUTOMATED COUNT: 15.5 % (ref 11.6–15.1)
GFR SERPL CREATININE-BSD FRML MDRD: 40 ML/MIN/1.73SQ M
GLUCOSE SERPL-MCNC: 94 MG/DL (ref 65–140)
HAV IGM SER QL: NORMAL
HBV CORE IGM SER QL: NORMAL
HBV SURFACE AG SER QL: NORMAL
HCT VFR BLD AUTO: 38.3 % (ref 36.5–49.3)
HCV AB SER QL: NORMAL
HGB BLD-MCNC: 12.9 G/DL (ref 12–17)
INR PPP: 1.56 (ref 0.85–1.19)
MCH RBC QN AUTO: 34 PG (ref 26.8–34.3)
MCHC RBC AUTO-ENTMCNC: 33.7 G/DL (ref 31.4–37.4)
MCV RBC AUTO: 101 FL (ref 82–98)
MITOCHONDRIA M2 IGG SER-ACNC: <20 UNITS (ref 0–20)
PLATELET # BLD AUTO: 124 THOUSANDS/UL (ref 149–390)
PMV BLD AUTO: 11 FL (ref 8.9–12.7)
POTASSIUM SERPL-SCNC: 4.4 MMOL/L (ref 3.5–5.3)
PROT SERPL-MCNC: 5.6 G/DL (ref 6.4–8.4)
PROTHROMBIN TIME: 19.4 SECONDS (ref 12.3–15)
RBC # BLD AUTO: 3.79 MILLION/UL (ref 3.88–5.62)
SODIUM SERPL-SCNC: 135 MMOL/L (ref 135–147)
WBC # BLD AUTO: 7.25 THOUSAND/UL (ref 4.31–10.16)

## 2025-07-01 PROCEDURE — 80053 COMPREHEN METABOLIC PANEL: CPT | Performed by: STUDENT IN AN ORGANIZED HEALTH CARE EDUCATION/TRAINING PROGRAM

## 2025-07-01 PROCEDURE — 99232 SBSQ HOSP IP/OBS MODERATE 35: CPT

## 2025-07-01 PROCEDURE — 86664 EPSTEIN-BARR NUCLEAR ANTIGEN: CPT | Performed by: PHYSICIAN ASSISTANT

## 2025-07-01 PROCEDURE — 85610 PROTHROMBIN TIME: CPT | Performed by: PHYSICIAN ASSISTANT

## 2025-07-01 PROCEDURE — 85027 COMPLETE CBC AUTOMATED: CPT | Performed by: STUDENT IN AN ORGANIZED HEALTH CARE EDUCATION/TRAINING PROGRAM

## 2025-07-01 PROCEDURE — 99232 SBSQ HOSP IP/OBS MODERATE 35: CPT | Performed by: INTERNAL MEDICINE

## 2025-07-01 PROCEDURE — 86665 EPSTEIN-BARR CAPSID VCA: CPT | Performed by: PHYSICIAN ASSISTANT

## 2025-07-01 PROCEDURE — 76705 ECHO EXAM OF ABDOMEN: CPT

## 2025-07-01 PROCEDURE — 71045 X-RAY EXAM CHEST 1 VIEW: CPT

## 2025-07-01 PROCEDURE — 86663 EPSTEIN-BARR ANTIBODY: CPT | Performed by: PHYSICIAN ASSISTANT

## 2025-07-01 RX ORDER — FUROSEMIDE 10 MG/ML
40 INJECTION INTRAMUSCULAR; INTRAVENOUS DAILY
Status: DISCONTINUED | OUTPATIENT
Start: 2025-07-01 | End: 2025-07-02

## 2025-07-01 RX ADMIN — METOPROLOL SUCCINATE 50 MG: 50 TABLET, EXTENDED RELEASE ORAL at 09:27

## 2025-07-01 RX ADMIN — FINASTERIDE 5 MG: 5 TABLET, FILM COATED ORAL at 09:25

## 2025-07-01 RX ADMIN — ATORVASTATIN CALCIUM 10 MG: 10 TABLET, FILM COATED ORAL at 17:00

## 2025-07-01 RX ADMIN — METOPROLOL SUCCINATE 100 MG: 100 TABLET, EXTENDED RELEASE ORAL at 21:08

## 2025-07-01 RX ADMIN — RIVAROXABAN 20 MG: 20 TABLET, FILM COATED ORAL at 17:00

## 2025-07-01 RX ADMIN — AMLODIPINE BESYLATE 2.5 MG: 2.5 TABLET ORAL at 21:08

## 2025-07-01 RX ADMIN — FUROSEMIDE 40 MG: 10 INJECTION, SOLUTION INTRAMUSCULAR; INTRAVENOUS at 09:27

## 2025-07-01 NOTE — ASSESSMENT & PLAN NOTE
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   He has a history of metastatic pleomorphic leiomyosarcoma, initially treated with doxorubicin 6- 8/2023, palliative RT to the spine 10/2023, and on pazopanib from 06/2024-06/2025 when he was recently found to have progression in the liver. He started trabectedin with C1D1 06/24/2025.    Patient reported shortness of breath for the last few days and was getting worse after he got his new chemotherapy.  Labs creatinine 1.89, AST 3013, , troponin 79> 73, , platelet count 141.  Mildly elevated troponin likely due to nonischemic myocardial injury due to volume overload.  CT PE study showed No pulmonary embolism. Bilateral moderate pleural effusions and associated compressive atelectasis of both lower lobes. Pulmonary nodules as above. These are suspicious for metastatic disease given history. There is suggestion of numerous small ill-defined liver lesions suspicious for metastases.  Echo done showing EF of 55% with systolic function normal, LA and RA mildly dilated, mild to moderate regurg of AV, MV, TV.  Plan:  Currently afebrile, ill-appearing however acutely nontoxic appearing.  Reports symptoms improvement with Lasix.  Currently O2 saturation 92 to 93% on room air  Continue IV Lasix 40 mg daily.  On discussion with the patient and the family they would appreciate cardiology evaluation.  Given persistent large pleural effusion will consider doing thoracentesis to rule out malignant cause of the pleural effusion.  Monitor urine output.

## 2025-07-01 NOTE — PLAN OF CARE
Problem: PAIN - ADULT  Goal: Verbalizes/displays adequate comfort level or baseline comfort level  Description: Interventions:  - Encourage patient to monitor pain and request assistance  - Assess pain using appropriate pain scale  - Administer analgesics as ordered based on type and severity of pain and evaluate response  - Implement non-pharmacological measures as appropriate and evaluate response  - Consider cultural and social influences on pain and pain management  - Notify physician/advanced practitioner if interventions unsuccessful or patient reports new pain  - Educate patient/family on pain management process including their role and importance of  reporting pain   - Provide non-pharmacologic/complimentary pain relief interventions  Outcome: Progressing     Problem: INFECTION - ADULT  Goal: Absence or prevention of progression during hospitalization  Description: INTERVENTIONS:  - Assess and monitor for signs and symptoms of infection  - Monitor lab/diagnostic results  - Monitor all insertion sites, i.e. indwelling lines, tubes, and drains  - Monitor endotracheal if appropriate and nasal secretions for changes in amount and color  - [place] appropriate cooling/warming therapies per order  - Administer medications as ordered  - Instruct and encourage patient and family to use good hand hygiene technique  - Identify and instruct in appropriate isolation precautions for identified infection/condition  Outcome: Progressing  Goal: Absence of fever/infection during neutropenic period  Description: INTERVENTIONS:  - Monitor WBC  - Perform strict hand hygiene  - Limit to healthy visitors only  - No plants, dried, fresh or silk flowers with lopez in patient room  Outcome: Progressing     Problem: Excess Fluid Volume  Goal: Patient is able to achieve and maintain homeostasis  Description: INTERVENTIONS: Monitor for sign and symptoms of fluid overload  - Evaluate LE edema every shift  - Elevate LE to prevent  dependent edema  - Apply COLLIN stockings as ordered   - Monitor ankle circumference daily  - Assess for jugular vein distention  - Evaluate provider orders for the CHF diuretic algorithm. Administer diuretics as ordered  - Weigh the patient daily at 0600 and report a weight gain of five pounds or more   - Strict intake and output  - Monitor fluid intake and adhere to fluid restrictions  - Assess lung sounds every shift and as needed  - Monitor vital signs and lab values (CBC, chem, BUN, BNP)  - Measure and document urine output    Outcome: Progressing     Problem: Knowledge Deficit  Goal: Patient is able to verbalize understanding of Heart Failure after education  Description: INTERVENTIONS:  - Educate the patient and family on signs and symptoms of HF  - Provide the patient with HF education and HF zone tool  - Educate on the importance of daily weight in the AM and reporting a weight gain               of 3 or more pounds to their primary care physician  - Monitor for SOB  - Maintain and sodium and fluid restriction  - Educate the patient on the importance of medications such as: diuretics, betablockers,               antiarrhythmics and their purpose, dose, route, side effects and labs               if they are needed    Outcome: Progressing

## 2025-07-01 NOTE — ASSESSMENT & PLAN NOTE
At baseline, patient has normal LFTs.  Last MRI performed at [facility] was in June 2025 revealing increase size and number of multiple small liver metastasis as compared to exam performed in January 2025.  On admission, LFTs were as follows: , , alkaline phosphatase 206, total bilirubin 1.75.  Liver enzymes slightly decreased today.  INR 1.31.  Platelet count 123,000.  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  According to liver tox database, this has a likelihood score of C meaning probable cause of clinical apparent liver injury, generally in the setting of pre-existing liver disease and high doses.  According to the database, severity of liver injury ranges from mild elevations to clinically apparent liver injury with jaundice and hepatic failure.  According to prescribing information, the drug is also known to cause renal dysfunction or failure.  Fortunately his creatinine is improving.  Fortunately, patient's liver enzymes are trending downward today.  His creatinine is also improving.  His pulmonary function is not back to baseline as of yet.  Updated right upper quadrant ultrasound given patient's PATRICIA does not show dilated bile ducts.  -Trend CMP, platelet count and INR daily  -If liver enzymes continue to uptrend and/or INR, would need to consider steroids  -Full liver workup as ordered  -Avoid other hepatotoxic drugs  -Avoid hypotension  -Monitor mental status closely  -Left message at [facility] hematology oncology, they will make patient's oncologist aware and likely they were able to see his lab work in Care Everywhere; provided them with our contact information   -Cardiology consult pending  -Patient is wife agree with plan

## 2025-07-01 NOTE — PROGRESS NOTES
Progress Note - Gastroenterology   Name: Hernando Burkett 79 y.o. 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  Last MRI performed at [facility] was in June 2025 revealing increase size and number of multiple small liver metastasis as compared to exam performed in January 2025.  On admission, LFTs were as follows: , , alkaline phosphatase 206, total bilirubin 1.75.  Liver enzymes slightly decreased today.  INR 1.31.  Platelet count 123,000.  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  According to liver tox database, this has a likelihood score of C meaning probable cause of clinical apparent liver injury, generally in the setting of pre-existing liver disease and high doses.  According to the database, severity of liver injury ranges from mild elevations to clinically apparent liver injury with jaundice and hepatic failure.  According to prescribing information, the drug is also known to cause renal dysfunction or failure.  Fortunately his creatinine is improving.  Fortunately, patient's liver enzymes are trending downward today.  His creatinine is also improving.  His pulmonary function is not back to baseline as of yet.  Updated right upper quadrant ultrasound given patient's PATRICIA does not show dilated bile ducts.  -Trend CMP, platelet count and INR daily  -If liver enzymes continue to uptrend and/or INR, would need to consider steroids  -Full liver workup as ordered  -Avoid other hepatotoxic drugs  -Avoid hypotension  -Monitor mental status closely  -Left message at [facility] hematology oncology, they will make patient's oncologist aware and likely they were able to see his lab work in Care  Everywhere; provided them with our contact information   -Cardiology consult pending  -Patient is wife agree with plan    I have discussed the above management plan in detail with the primary service.  Patient be seen by Dr. Cortes.    Subjective   Patient reports that with the help of diuretics, he is having good urine output.  Feels that his breathing is improving each day.  We went over his labs in detail with him and his wife.    Objective :  Temp:  [97 °F (36.1 °C)-98.3 °F (36.8 °C)] 97.3 °F (36.3 °C)  HR:  [] 89  BP: (108-132)/(68-84) 128/74  Resp:  [16-17] 17  SpO2:  [91 %-95 %] 95 %  O2 Device: Nasal cannula  Nasal Cannula O2 Flow Rate (L/min):  [2 L/min] 2 L/min    Physical Exam  Constitutional:       General: He is not in acute distress.     Appearance: He is well-developed. He is not diaphoretic.   HENT:      Head: Normocephalic and atraumatic.     Eyes:      General: No scleral icterus.     Conjunctiva/sclera: Conjunctivae normal.      Pupils: Pupils are equal, round, and reactive to light.     Neck:      Thyroid: No thyromegaly.      Trachea: No tracheal deviation.     Cardiovascular:      Rate and Rhythm: Normal rate and regular rhythm.   Pulmonary:      Effort: Pulmonary effort is normal.      Breath sounds: Normal breath sounds. No wheezing or rales.      Comments: 2 L of nasal cannula  Abdominal:      General: Bowel sounds are normal. There is distension.      Palpations: Abdomen is soft. Abdomen is not rigid. There is no mass.      Tenderness: There is no abdominal tenderness. There is no guarding or rebound.     Musculoskeletal:      Cervical back: Neck supple.   Lymphadenopathy:      Cervical: No cervical adenopathy.     Skin:     General: Skin is warm and dry.      Findings: No erythema or rash.     Neurological:      Mental Status: He is alert and oriented to person, place, and time.     Psychiatric:         Behavior: Behavior normal.       Lab Results: I have reviewed the following  results:

## 2025-07-01 NOTE — PROGRESS NOTES
Progress Note - Hospitalist   Name: Hernando Burkett 79 y.o. 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   He has a history of metastatic pleomorphic leiomyosarcoma, initially treated with doxorubicin 6- 8/2023, palliative RT to the spine 10/2023, and on pazopanib from 06/2024-06/2025 when he was recently found to have progression in the liver. He started trabectedin with C1D1 06/24/2025.    Patient reported shortness of breath for the last few days and was getting worse after he got his new chemotherapy.  Labs creatinine 1.89, AST 3013, , troponin 79> 73, , platelet count 141.  Mildly elevated troponin likely due to nonischemic myocardial injury due to volume overload.  CT PE study showed No pulmonary embolism. Bilateral moderate pleural effusions and associated compressive atelectasis of both lower lobes. Pulmonary nodules as above. These are suspicious for metastatic disease given history. There is suggestion of numerous small ill-defined liver lesions suspicious for metastases.  Echo done showing EF of 55% with systolic function normal, LA and RA mildly dilated, mild to moderate regurg of AV, MV, TV.  Plan:  Currently afebrile, ill-appearing however acutely nontoxic appearing.  Reports symptoms improvement with Lasix.  Currently O2 saturation 92 to 93% on room air  Continue IV Lasix 40 mg daily.  On discussion with the patient and the family they would appreciate cardiology evaluation.  Given persistent large pleural effusion will consider doing thoracentesis to rule out malignant cause of the pleural effusion.  Monitor urine output.  Acute hypoxic respiratory failure (HCC)  Patient was hypoxic  in the ED  Patient required 2 L of oxygen in the setting of bilateral pleural effusion  Continue to monitor oxygen saturation  CAT scan report reviewed noted with bilateral moderate pleural effusion associated with compressive atelectasis in both lower lobes.  Responding well to diuretics per  Continue IV Lasix 40 mg daily.  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 He started trabectedin with C1D1 06/24/2025.  CT PE study showed No pulmonary embolism. Bilateral moderate pleural effusions and associated compressive atelectasis of both lower lobes. Pulmonary nodules as above. These are suspicious for metastatic disease given history. There is suggestion of numerous small ill-defined liver lesions suspicious for metastases.  Patient follows up with heme-onc outpatient at [facility].  GI consult.  A-fib (HCC)  Patient with history of A-fib on Xarelto  Patient on metoprolol succinate 50 in the morning and 100 at night  Continue current regimen  Bilateral pleural effusion  See management as above  Transaminitis  Patient presented with elevated liver enzymes  AST 3013 and   Per GI team was in touch with Dr. Barakat [facility] oncology suspected care for therapy induced daily.  Patient might require dose adjustment in association with steroids chemo management.  Will reassess CMP in the morning  Follow-up with GI recommendation.  Continue to monitor  PATRICIA (acute kidney injury) (HCC)  Patient creatinine presentation 1.89  Baseline 0.9  Currently 1.71 improved with IV Lasix.  Can be side effect of new chemotherapy versus cardiorenal from heart failure  Hypertension  Continue amlodipine 2.5 and metoprolol succinate  BPH (benign prostatic hyperplasia)  Continue finasteride    VTE  Pharmacologic Prophylaxis:   Moderate Risk (Score 3-4) - Pharmacological DVT Prophylaxis Ordered: apixaban (Eliquis).    Mobility:   Basic Mobility Inpatient Raw Score: 23  JH-HLM Goal: 7: Walk 25 feet or more  JH-HLM Achieved: 7: Walk 25 feet or more  JH-HLM Goal achieved. Continue to encourage appropriate mobility.    Patient Centered Rounds: I performed bedside rounds with nursing staff today.   Discussions with Specialists or Other Care Team Provider: Discussed plan with GI team    Education and Discussions with Family / Patient: Updated  (wife) at bedside.    Current Length of Stay: 2 day(s)  Current Patient Status: Inpatient   Certification Statement: The patient will continue to require additional inpatient hospital stay due to shortness of breath in the setting of pleural effusion and transaminitis  Discharge Plan: Anticipate discharge in 24-48 hrs to home.    Code Status: Level 1 - Full Code    Subjective   Patient admits that his symptoms are improving after diuresis.  Patient reports less shortness of breath with ambulation.    Objective :  Temp:  [97 °F (36.1 °C)-97.8 °F (36.6 °C)] 97.8 °F (36.6 °C)  HR:  [80-91] 88  BP: ()/(68-76) 93/69  Resp:  [16-17] 16  SpO2:  [91 %-95 %] 95 %  O2 Device: Nasal cannula  Nasal Cannula O2 Flow Rate (L/min):  [2 L/min] 2 L/min    Body mass index is 27.76 kg/m².     Input and Output Summary (last 24 hours):     Intake/Output Summary (Last 24 hours) at 7/1/2025 1726  Last data filed at 7/1/2025 1701  Gross per 24 hour   Intake 120 ml   Output 1450 ml   Net -1330 ml       Physical Exam  Vitals and nursing note reviewed.   Constitutional:       General: He is not in acute distress.     Appearance: He is well-developed.   HENT:      Head: Normocephalic and atraumatic.     Eyes:      Conjunctiva/sclera: Conjunctivae normal.       Cardiovascular:      Rate and Rhythm: Normal rate and regular rhythm.      Heart sounds: No murmur heard.  Pulmonary:       Effort: Pulmonary effort is normal. No respiratory distress.      Breath sounds: No wheezing or rales.      Comments: Decreased breathing sounds  Abdominal:      Palpations: Abdomen is soft.      Tenderness: There is no abdominal tenderness.     Musculoskeletal:         General: No swelling.      Cervical back: Neck supple.      Right lower leg: No edema.      Left lower leg: No edema.     Skin:     General: Skin is warm and dry.      Capillary Refill: Capillary refill takes less than 2 seconds.     Neurological:      Mental Status: He is alert.     Psychiatric:         Mood and Affect: Mood normal.           Lines/Drains:  Lines/Drains/Airways       Active Status       Name Placement date Placement time Site Days    Port A Cath 04/29/23 Right Subclavian 04/29/23  1721  Subclavian  794                    Central Line:  Goal for removal: Port accessed. Will de-access as appropriate.         Telemetry:  Telemetry Orders (From admission, onward)               24 Hour Telemetry Monitoring  Continuous x 24 Hours (Telem)        Expiring   Question:  Reason for 24 Hour Telemetry  Answer:  Decompensated CHF- and any one of the following: continuous diuretic infusion or total diuretic dose >200 mg daily, associated electrolyte derangement (I.e. K < 3.0), inotropic drip (continuous infusion), hx of ventricular arrhythmia, or new EF < 35%                     Telemetry Reviewed: Normal Sinus Rhythm  Indication for Continued Telemetry Use: Arrthymias requiring medical therapy               Lab Results: I have reviewed the following results:   Results from last 7 days   Lab Units 07/01/25  0543 06/30/25  0534   WBC Thousand/uL 7.25 6.60   HEMOGLOBIN g/dL 12.9 12.4   HEMATOCRIT % 38.3 36.8   PLATELETS Thousands/uL 124* 123*   SEGS PCT %  --  78*   LYMPHO PCT %  --  12*   MONO PCT %  --  7   EOS PCT %  --  2     Results from last 7 days   Lab Units 07/01/25  0543   SODIUM mmol/L 135   POTASSIUM mmol/L 4.4   CHLORIDE mmol/L 104    CO2 mmol/L 25   BUN mg/dL 38*   CREATININE mg/dL 1.59*   ANION GAP mmol/L 6   CALCIUM mg/dL 7.8*   ALBUMIN g/dL 3.5   TOTAL BILIRUBIN mg/dL 1.37*   ALK PHOS U/L 218*   ALT U/L 343*   AST U/L 257*   GLUCOSE RANDOM mg/dL 94     Results from last 7 days   Lab Units 07/01/25  1030   INR  1.56*             Results from last 7 days   Lab Units 06/29/25  1151   LACTIC ACID mmol/L 1.3       Recent Cultures (last 7 days):         Imaging Results Review: I reviewed radiology reports from this admission including: chest xray.  Other Study Results Review: No additional pertinent studies reviewed.    Last 24 Hours Medication List:     Current Facility-Administered Medications:     amLODIPine (NORVASC) tablet 2.5 mg, HS    atorvastatin (LIPITOR) tablet 10 mg, Daily With Dinner    finasteride (PROSCAR) tablet 5 mg, Daily    furosemide (LASIX) injection 40 mg, Daily    metoprolol succinate (TOPROL-XL) 24 hr tablet 100 mg, HS    metoprolol succinate (TOPROL-XL) 24 hr tablet 50 mg, Daily    rivaroxaban (XARELTO) tablet 20 mg, Daily With Dinner    Administrative Statements   Today, Patient Was Seen By: Rachael Hunt MD      **Please Note: This note may have been constructed using a voice recognition system.**

## 2025-07-01 NOTE — ASSESSMENT & PLAN NOTE
Patient presented with elevated liver enzymes  AST 3013 and   Per GI team was in touch with Dr. Barakat [facility] oncology suspected care for therapy induced daily.  Patient might require dose adjustment in association with steroids chemo management.  Will reassess CMP in the morning  Follow-up with GI recommendation.  Continue to monitor

## 2025-07-01 NOTE — PLAN OF CARE
Problem: PAIN - ADULT  Goal: Verbalizes/displays adequate comfort level or baseline comfort level  Description: Interventions:  - Encourage patient to monitor pain and request assistance  - Assess pain using appropriate pain scale  - Administer analgesics as ordered based on type and severity of pain and evaluate response  - Implement non-pharmacological measures as appropriate and evaluate response  - Consider cultural and social influences on pain and pain management  - Notify physician/advanced practitioner if interventions unsuccessful or patient reports new pain  - Educate patient/family on pain management process including their role and importance of  reporting pain   - Provide non-pharmacologic/complimentary pain relief interventions  Outcome: Progressing     Problem: INFECTION - ADULT  Goal: Absence or prevention of progression during hospitalization  Description: INTERVENTIONS:  - Assess and monitor for signs and symptoms of infection  - Monitor lab/diagnostic results  - Monitor all insertion sites, i.e. indwelling lines, tubes, and drains  - Monitor endotracheal if appropriate and nasal secretions for changes in amount and color  - [place] appropriate cooling/warming therapies per order  - Administer medications as ordered  - Instruct and encourage patient and family to use good hand hygiene technique  - Identify and instruct in appropriate isolation precautions for identified infection/condition  Outcome: Progressing  Goal: Absence of fever/infection during neutropenic period  Description: INTERVENTIONS:  - Monitor WBC  - Perform strict hand hygiene  - Limit to healthy visitors only  - No plants, dried, fresh or silk flowers with lopez in patient room  Outcome: Progressing     Problem: SAFETY ADULT  Goal: Patient will remain free of falls  Description: INTERVENTIONS:  - Educate patient/family on patient safety including physical limitations  - Instruct patient to call for assistance with activity   -  Consider consulting OT/PT to assist with strengthening/mobility based on AM PAC & JH-HLM score  - Consult OT/PT to assist with strengthening/mobility   - Keep Call bell within reach  - Keep bed low and locked with side rails adjusted as appropriate  - Keep care items and personal belongings within reach  - Initiate and maintain comfort rounds  - Make Fall Risk Sign visible to staff  - Offer Toileting every 2 Hours, in advance of need  - Initiate/Maintain bed alarm  - Obtain necessary fall risk management equipment: call bell within reach   - Apply yellow socks and bracelet for high fall risk patients  - Consider moving patient to room near nurses station  Outcome: Progressing  Goal: Maintain or return to baseline ADL function  Description: INTERVENTIONS:  -  Assess patient's ability to carry out ADLs; assess patient's baseline for ADL function and identify physical deficits which impact ability to perform ADLs (bathing, care of mouth/teeth, toileting, grooming, dressing, etc.)  - Assess/evaluate cause of self-care deficits   - Assess range of motion  - Assess patient's mobility; develop plan if impaired  - Assess patient's need for assistive devices and provide as appropriate  - Encourage maximum independence but intervene and supervise when necessary  - Involve family in performance of ADLs  - Assess for home care needs following discharge   - Consider OT consult to assist with ADL evaluation and planning for discharge  - Provide patient education as appropriate  - Monitor functional capacity and physical performance, use of AM PAC & JH-HLM   - Monitor gait, balance and fatigue with ambulation    Outcome: Progressing  Goal: Maintains/Returns to pre admission functional level  Description: INTERVENTIONS:  - Perform AM-PAC 6 Click Basic Mobility/ Daily Activity assessment daily.  - Set and communicate daily mobility goal to care team and patient/family/caregiver.   - Collaborate with rehabilitation services on  mobility goals if consulted  - Perform Range of Motion 3 times a day.  - Reposition patient every 2 hours.  - Dangle patient 3 times a day  - Stand patient 3 times a day  - Ambulate patient 3 times a day  - Out of bed to chair 3 times a day   - Out of bed for meals 3 times a day  - Out of bed for toileting  - Record patient progress and toleration of activity level   Outcome: Progressing     Problem: DISCHARGE PLANNING  Goal: Discharge to home or other facility with appropriate resources  Description: INTERVENTIONS:  - Identify barriers to discharge w/patient and caregiver  - Arrange for needed discharge resources and transportation as appropriate  - Identify discharge learning needs (meds, wound care, etc.)  - Arrange for interpretive services to assist at discharge as needed  - Refer to Case Management Department for coordinating discharge planning if the patient needs post-hospital services based on physician/advanced practitioner order or complex needs related to functional status, cognitive ability, or social support system  Outcome: Progressing     Problem: Knowledge Deficit  Goal: Patient/family/caregiver demonstrates understanding of disease process, treatment plan, medications, and discharge instructions  Description: Complete learning assessment and assess knowledge base.  Interventions:  - Provide teaching at level of understanding  - Provide teaching via preferred learning methods  Outcome: Progressing     Problem: Decreased Cardiac Output  Goal: Cardiac output adequate for individual needs  Description: INTERVENTIONS: Monitor for signs and symptoms of decreased cardiac output   - Monitor for dyspnea with exertion and at rest  - Monitor for orthopnea  - Monitor for signs of tachycardia. Place patient on telemetry monitoring.  - Assess patient for jugular vein distention  - Assess patient for lower extremity edema and poor peripheral perfusion   - Auscultate lung sound for Fine bibasilar crackles    - Monitor for cardiac arrythmias   - Administer beta blockers, antiarrhythmic, and blood pressure medications as ordered    Outcome: Progressing     Problem: Impaired Gas Exchange  Goal: Optimize oxygenation and ensure adequate ventilation  Description: INTERVENTIONS: Monitor for signs and symptoms of respiratory distress                - Elevate HOB or use high fowlers to promote lung expansion                - Administer oxygen as ordered to maintain adequate oxygenation                - Encourage use of IS to promote lung expansion and prevent PN                - Monitor ABGs to assess oxygenation status                - Monitor blood oxygen level to maintain adequate oxygenation                - Encourage cough and deep breathing exercises to promote lung expansion                - Monitor patient's mental status for increased confusion    Outcome: Progressing     Problem: Excess Fluid Volume  Goal: Patient is able to achieve and maintain homeostasis  Description: INTERVENTIONS: Monitor for sign and symptoms of fluid overload  - Evaluate LE edema every shift  - Elevate LE to prevent dependent edema  - Apply COLLIN stockings as ordered   - Monitor ankle circumference daily  - Assess for jugular vein distention  - Evaluate provider orders for the CHF diuretic algorithm. Administer diuretics as ordered  - Weigh the patient daily at 0600 and report a weight gain of five pounds or more   - Strict intake and output  - Monitor fluid intake and adhere to fluid restrictions  - Assess lung sounds every shift and as needed  - Monitor vital signs and lab values (CBC, chem, BUN, BNP)  - Measure and document urine output    Outcome: Progressing     Problem: Activity Intolerance  Goal: Patient is able to perform activities within their limitations  Description: INTERVENTIONS:                       -   Alternate periods of activity with periods of rest                 -   Patients is able to maintain normal vitals heart rhythm  during activity                 -   Gradually increase activity and exercise as patient can tolerate                 -   Monitor blood pressure and heart before and after exercise                  -   Monitor blood oxygen saturation during activity and apply oxygen as needed    Outcome: Progressing     Problem: Knowledge Deficit  Goal: Patient is able to verbalize understanding of Heart Failure after education  Description: INTERVENTIONS:  - Educate the patient and family on signs and symptoms of HF  - Provide the patient with HF education and HF zone tool  - Educate on the importance of daily weight in the AM and reporting a weight gain               of 3 or more pounds to their primary care physician  - Monitor for SOB  - Maintain and sodium and fluid restriction  - Educate the patient on the importance of medications such as: diuretics, betablockers,               antiarrhythmics and their purpose, dose, route, side effects and labs               if they are needed    Outcome: Progressing

## 2025-07-01 NOTE — TELEPHONE ENCOUNTER
Dr. Lindsey called returning call from SHARON Moreau. He can be reached on his cell at [phone] or at the office [phone].

## 2025-07-01 NOTE — ASSESSMENT & PLAN NOTE
He has a history of metastatic pleomorphic leiomyosarcoma, initially treated with doxorubicin 6- 8/2023, palliative RT to the spine 10/2023, and on pazopanib from 06/2024-06/2025 when he was recently found to have progression in the liver. He started trabectedin with C1D1 06/24/2025.  CT PE study showed No pulmonary embolism. Bilateral moderate pleural effusions and associated compressive atelectasis of both lower lobes. Pulmonary nodules as above. These are suspicious for metastatic disease given history. There is suggestion of numerous small ill-defined liver lesions suspicious for metastases.  Patient follows up with heme-onc outpatient at [facility].  GI consult.

## 2025-07-01 NOTE — ASSESSMENT & PLAN NOTE
At baseline, patient has normal LFTs.  Last MRI performed at [facility] was in June 2025 revealing increase size and number of multiple small liver metastasis as compared to exam performed in January 2025.  On admission, LFTs were as follows: , , alkaline phosphatase 206, total bilirubin 1.75.  Liver enzymes slightly decreased today.  INR 1.31.  Platelet count 123,000.  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  According to liver tox database, this has a likelihood score of C meaning probable cause of clinical apparent liver injury, generally in the setting of pre-existing liver disease and high doses.  According to the database, severity of liver injury ranges from mild elevations to clinically apparent liver injury with jaundice and hepatic failure.  According to prescribing information, the drug is also known to cause renal dysfunction or failure.  Fortunately his creatinine is improving.  Fortunately, patient's liver enzymes are trending downward today.  His creatinine is also improving.  His pulmonary function is not back to baseline as of yet.  Updated right upper quadrant ultrasound given patient's PATRICIA does not show dilated bile ducts.  -Trend CMP, platelet count and INR daily  -If liver enzymes continue to uptrend and/or INR, would need to consider steroids  -Full liver workup as ordered  -Avoid other hepatotoxic drugs  -Avoid hypotension  -Monitor mental status closely  -Left message at [facility] hematology oncology, they will make patient's oncologist aware and likely they were able to see his lab work in Care Everywhere; provided them with our contact information   -Cardiology consult pending  -Patient is wife agree with plan

## 2025-07-01 NOTE — ASSESSMENT & PLAN NOTE
At baseline, patient has normal LFTs.  Last MRI performed at [facility] was in June 2025 revealing increase size and number of multiple small liver metastasis as compared to exam performed in January 2025.  On admission, LFTs were as follows: , , alkaline phosphatase 206, total bilirubin 1.75.  Liver enzymes slightly decreased today.  INR 1.31.  Platelet count 123,000.  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  According to liver tox database, this has a likelihood score of C meaning probable cause of clinical apparent liver injury, generally in the setting of pre-existing liver disease and high doses.  According to the database, severity of liver injury ranges from mild elevations to clinically apparent liver injury with jaundice and hepatic failure.  According to prescribing information, the drug is also known to cause renal dysfunction or failure.  Fortunately his creatinine is improving.  Fortunately, patient's liver enzymes are trending downward today.  His creatinine is also improving.  His pulmonary function is not back to baseline as of yet.  Updated right upper quadrant ultrasound given patient's PATRICIA does not show dilated bile ducts.  -Trend CMP, platelet count and INR daily  -If liver enzymes continue to uptrend and/or INR, would need to consider steroids  -Full liver workup as ordered  -Avoid other hepatotoxic drugs  -Avoid hypotension  -Monitor mental status closely  -Left message at [facility] hematology oncology, they will make patient's oncologist aware and likely they were able to see his lab work in Care Everywhere; provided them with our contact information   -Cardiology consult pending  -Patient is wife agree with plan

## 2025-07-01 NOTE — OCCUPATIONAL THERAPY NOTE
Occupational Therapy Screen Note        Patient Name: Hernando Burkett  Today's Date: 7/1/2025 07/01/25 1205   OT Last Visit   OT Visit Date 07/01/25   Note Type   Note type Screen   Additional Comments OT order received, chart review completed. Patient and wife reporting much improvement since admission on Sunday. Patient has been ambulating independently in room and performing self care tasks without an AD or physical assistance from staff. No acute OT needs identified to warrant OT evaluation. Patient and wife in agreement with such. D/C OT services.

## 2025-07-01 NOTE — QUICK NOTE
Spoke with Dr Lindsey from [facility]. We appreciate his recommendations! Luckily he is able to see labs in Care Everywhere. Expect that patient may have DILI from chemo, however typically transient and may require dose adjustment/pre-medication of steroid in the future. We will continue to update patient's oncologist if any changes are concerning regarding liver enzymes and synthetic liver function. Slowly improving daily. Continue to monitor CMP, PT/INR, and Dr. Lindsey asked for a CK level which I entered.

## 2025-07-02 PROBLEM — I48.21 PERMANENT ATRIAL FIBRILLATION (HCC): Status: ACTIVE | Noted: 2025-06-29

## 2025-07-02 LAB
ALBUMIN SERPL BCG-MCNC: 3.1 G/DL (ref 3.5–5)
ALP SERPL-CCNC: 199 U/L (ref 34–104)
ALT SERPL W P-5'-P-CCNC: 276 U/L (ref 7–52)
ANION GAP SERPL CALCULATED.3IONS-SCNC: 6 MMOL/L (ref 4–13)
AST SERPL W P-5'-P-CCNC: 189 U/L (ref 13–39)
BASOPHILS # BLD AUTO: 0.02 THOUSANDS/ÂΜL (ref 0–0.1)
BASOPHILS NFR BLD AUTO: 0 % (ref 0–1)
BILIRUB SERPL-MCNC: 1.1 MG/DL (ref 0.2–1)
BUN SERPL-MCNC: 36 MG/DL (ref 5–25)
CALCIUM ALBUM COR SERPL-MCNC: 8.4 MG/DL (ref 8.3–10.1)
CALCIUM SERPL-MCNC: 7.7 MG/DL (ref 8.4–10.2)
CHLORIDE SERPL-SCNC: 105 MMOL/L (ref 96–108)
CK SERPL-CCNC: 125 U/L (ref 39–308)
CO2 SERPL-SCNC: 26 MMOL/L (ref 21–32)
CREAT SERPL-MCNC: 1.5 MG/DL (ref 0.6–1.3)
EBV EA IGG SER QL IA: NEGATIVE
EBV NA IGG SER QL IA: POSITIVE
EBV VCA IGG SER QL IA: POSITIVE
EBV VCA IGM SER QL IA: NEGATIVE
EOSINOPHIL # BLD AUTO: 0.23 THOUSAND/ÂΜL (ref 0–0.61)
EOSINOPHIL NFR BLD AUTO: 4 % (ref 0–6)
ERYTHROCYTE [DISTWIDTH] IN BLOOD BY AUTOMATED COUNT: 15.2 % (ref 11.6–15.1)
GFR SERPL CREATININE-BSD FRML MDRD: 43 ML/MIN/1.73SQ M
GLUCOSE SERPL-MCNC: 90 MG/DL (ref 65–140)
HCT VFR BLD AUTO: 36.8 % (ref 36.5–49.3)
HGB BLD-MCNC: 12.5 G/DL (ref 12–17)
IMM GRANULOCYTES # BLD AUTO: 0.07 THOUSAND/UL (ref 0–0.2)
IMM GRANULOCYTES NFR BLD AUTO: 1 % (ref 0–2)
INR PPP: 1.57 (ref 0.85–1.19)
LYMPHOCYTES # BLD AUTO: 0.73 THOUSANDS/ÂΜL (ref 0.6–4.47)
LYMPHOCYTES NFR BLD AUTO: 13 % (ref 14–44)
MCH RBC QN AUTO: 33.7 PG (ref 26.8–34.3)
MCHC RBC AUTO-ENTMCNC: 34 G/DL (ref 31.4–37.4)
MCV RBC AUTO: 99 FL (ref 82–98)
MONOCYTES # BLD AUTO: 0.34 THOUSAND/ÂΜL (ref 0.17–1.22)
MONOCYTES NFR BLD AUTO: 6 % (ref 4–12)
NEUTROPHILS # BLD AUTO: 4.23 THOUSANDS/ÂΜL (ref 1.85–7.62)
NEUTS SEG NFR BLD AUTO: 76 % (ref 43–75)
NRBC BLD AUTO-RTO: 0 /100 WBCS
PLATELET # BLD AUTO: 125 THOUSANDS/UL (ref 149–390)
PMV BLD AUTO: 11.1 FL (ref 8.9–12.7)
POTASSIUM SERPL-SCNC: 4.3 MMOL/L (ref 3.5–5.3)
PROT SERPL-MCNC: 5.3 G/DL (ref 6.4–8.4)
PROTHROMBIN TIME: 19.6 SECONDS (ref 12.3–15)
RBC # BLD AUTO: 3.71 MILLION/UL (ref 3.88–5.62)
SODIUM SERPL-SCNC: 137 MMOL/L (ref 135–147)
WBC # BLD AUTO: 5.62 THOUSAND/UL (ref 4.31–10.16)

## 2025-07-02 PROCEDURE — 82550 ASSAY OF CK (CPK): CPT | Performed by: PHYSICIAN ASSISTANT

## 2025-07-02 PROCEDURE — 99232 SBSQ HOSP IP/OBS MODERATE 35: CPT | Performed by: INTERNAL MEDICINE

## 2025-07-02 PROCEDURE — 99222 1ST HOSP IP/OBS MODERATE 55: CPT

## 2025-07-02 PROCEDURE — 80053 COMPREHEN METABOLIC PANEL: CPT

## 2025-07-02 PROCEDURE — 99233 SBSQ HOSP IP/OBS HIGH 50: CPT

## 2025-07-02 PROCEDURE — 85025 COMPLETE CBC W/AUTO DIFF WBC: CPT

## 2025-07-02 PROCEDURE — 85610 PROTHROMBIN TIME: CPT | Performed by: PHYSICIAN ASSISTANT

## 2025-07-02 RX ORDER — FUROSEMIDE 10 MG/ML
40 INJECTION INTRAMUSCULAR; INTRAVENOUS DAILY
Status: DISCONTINUED | OUTPATIENT
Start: 2025-07-03 | End: 2025-07-04

## 2025-07-02 RX ORDER — METOPROLOL SUCCINATE 50 MG/1
50 TABLET, EXTENDED RELEASE ORAL 2 TIMES DAILY
Status: DISCONTINUED | OUTPATIENT
Start: 2025-07-02 | End: 2025-07-02

## 2025-07-02 RX ORDER — METOPROLOL TARTRATE 50 MG
50 TABLET ORAL EVERY 12 HOURS SCHEDULED
Status: DISCONTINUED | OUTPATIENT
Start: 2025-07-02 | End: 2025-07-04 | Stop reason: HOSPADM

## 2025-07-02 RX ADMIN — METOPROLOL TARTRATE 50 MG: 50 TABLET, FILM COATED ORAL at 21:07

## 2025-07-02 RX ADMIN — RIVAROXABAN 20 MG: 20 TABLET, FILM COATED ORAL at 17:07

## 2025-07-02 RX ADMIN — FINASTERIDE 5 MG: 5 TABLET, FILM COATED ORAL at 08:19

## 2025-07-02 RX ADMIN — ATORVASTATIN CALCIUM 10 MG: 10 TABLET, FILM COATED ORAL at 17:07

## 2025-07-02 NOTE — PLAN OF CARE
Problem: PAIN - ADULT  Goal: Verbalizes/displays adequate comfort level or baseline comfort level  Description: Interventions:  - Encourage patient to monitor pain and request assistance  - Assess pain using appropriate pain scale  - Administer analgesics as ordered based on type and severity of pain and evaluate response  - Implement non-pharmacological measures as appropriate and evaluate response  - Consider cultural and social influences on pain and pain management  - Notify physician/advanced practitioner if interventions unsuccessful or patient reports new pain  - Educate patient/family on pain management process including their role and importance of  reporting pain   - Provide non-pharmacologic/complimentary pain relief interventions  7/2/2025 1158 by Tatiana Williamson LPN  Outcome: Progressing  7/2/2025 1158 by Tatiana Williamson LPN  Outcome: Progressing     Problem: INFECTION - ADULT  Goal: Absence or prevention of progression during hospitalization  Description: INTERVENTIONS:  - Assess and monitor for signs and symptoms of infection  - Monitor lab/diagnostic results  - Monitor all insertion sites, i.e. indwelling lines, tubes, and drains  - Monitor endotracheal if appropriate and nasal secretions for changes in amount and color  - [place] appropriate cooling/warming therapies per order  - Administer medications as ordered  - Instruct and encourage patient and family to use good hand hygiene technique  - Identify and instruct in appropriate isolation precautions for identified infection/condition  7/2/2025 1158 by Tatiana Williamson LPN  Outcome: Progressing  7/2/2025 1158 by Tatiana Williamson LPN  Outcome: Progressing  Goal: Absence of fever/infection during neutropenic period  Description: INTERVENTIONS:  - Monitor WBC  - Perform strict hand hygiene  - Limit to healthy visitors only  - No plants, dried, fresh or silk flowers with lopez in patient room  7/2/2025 1158 by Tatiana Williamson  LPN  Outcome: Progressing  7/2/2025 1158 by Tatiana Williamson LPN  Outcome: Progressing     Problem: SAFETY ADULT  Goal: Patient will remain free of falls  Description: INTERVENTIONS:  - Educate patient/family on patient safety including physical limitations  - Instruct patient to call for assistance with activity   - Consider consulting OT/PT to assist with strengthening/mobility based on AM PAC & JH-HLM score  - Consult OT/PT to assist with strengthening/mobility   - Keep Call bell within reach  - Keep bed low and locked with side rails adjusted as appropriate  - Keep care items and personal belongings within reach  - Initiate and maintain comfort rounds  - Make Fall Risk Sign visible to staff  - Offer Toileting every 2 Hours, in advance of need  - Initiate/Maintain bed alarm  - Obtain necessary fall risk management equipment: bed alarm  - Apply yellow socks and bracelet for high fall risk patients  - Consider moving patient to room near nurses station  7/2/2025 1158 by Tatiana Williamson LPN  Outcome: Progressing  7/2/2025 1158 by Tatiana Williamson LPN  Outcome: Progressing  Goal: Maintain or return to baseline ADL function  Description: INTERVENTIONS:  -  Assess patient's ability to carry out ADLs; assess patient's baseline for ADL function and identify physical deficits which impact ability to perform ADLs (bathing, care of mouth/teeth, toileting, grooming, dressing, etc.)  - Assess/evaluate cause of self-care deficits   - Assess range of motion  - Assess patient's mobility; develop plan if impaired  - Assess patient's need for assistive devices and provide as appropriate  - Encourage maximum independence but intervene and supervise when necessary  - Involve family in performance of ADLs  - Assess for home care needs following discharge   - Consider OT consult to assist with ADL evaluation and planning for discharge  - Provide patient education as appropriate  - Monitor functional capacity and physical  performance, use of AM PAC & -HLM   - Monitor gait, balance and fatigue with ambulation    7/2/2025 1158 by Tatiana Williamson LPN  Outcome: Progressing  7/2/2025 1158 by Tatiana Williamson LPN  Outcome: Progressing  Goal: Maintains/Returns to pre admission functional level  Description: INTERVENTIONS:  - Perform AM-PAC 6 Click Basic Mobility/ Daily Activity assessment daily.  - Set and communicate daily mobility goal to care team and patient/family/caregiver.   - Collaborate with rehabilitation services on mobility goals if consulted  - Perform Range of Motion 3 times a day.  - Reposition patient every 2 hours.  - Dangle patient 3 times a day  - Stand patient 3 times a day  - Ambulate patient 3 times a day  - Out of bed to chair 3 times a day   - Out of bed for meals 3 times a day  - Out of bed for toileting  - Record patient progress and toleration of activity level   7/2/2025 1158 by Tatiana Williamson LPN  Outcome: Progressing  7/2/2025 1158 by Tatiana Williamson LPN  Outcome: Progressing     Problem: DISCHARGE PLANNING  Goal: Discharge to home or other facility with appropriate resources  Description: INTERVENTIONS:  - Identify barriers to discharge w/patient and caregiver  - Arrange for needed discharge resources and transportation as appropriate  - Identify discharge learning needs (meds, wound care, etc.)  - Arrange for interpretive services to assist at discharge as needed  - Refer to Case Management Department for coordinating discharge planning if the patient needs post-hospital services based on physician/advanced practitioner order or complex needs related to functional status, cognitive ability, or social support system  7/2/2025 1158 by Tatiana Williamson LPN  Outcome: Progressing  7/2/2025 1158 by Tatiana Williamson LPN  Outcome: Progressing     Problem: Knowledge Deficit  Goal: Patient/family/caregiver demonstrates understanding of disease process, treatment plan, medications, and discharge  instructions  Description: Complete learning assessment and assess knowledge base.  Interventions:  - Provide teaching at level of understanding  - Provide teaching via preferred learning methods  7/2/2025 1158 by Tatiana Williamson LPN  Outcome: Progressing  7/2/2025 1158 by Tatiana Williamson LPN  Outcome: Progressing     Problem: Decreased Cardiac Output  Goal: Cardiac output adequate for individual needs  Description: INTERVENTIONS: Monitor for signs and symptoms of decreased cardiac output   - Monitor for dyspnea with exertion and at rest  - Monitor for orthopnea  - Monitor for signs of tachycardia. Place patient on telemetry monitoring.  - Assess patient for jugular vein distention  - Assess patient for lower extremity edema and poor peripheral perfusion   - Auscultate lung sound for Fine bibasilar crackles   - Monitor for cardiac arrythmias   - Administer beta blockers, antiarrhythmic, and blood pressure medications as ordered    7/2/2025 1158 by Tatiana Williamson LPN  Outcome: Progressing  7/2/2025 1158 by Tatiana Williamson LPN  Outcome: Progressing     Problem: Impaired Gas Exchange  Goal: Optimize oxygenation and ensure adequate ventilation  Description: INTERVENTIONS: Monitor for signs and symptoms of respiratory distress                - Elevate HOB or use high fowlers to promote lung expansion                - Administer oxygen as ordered to maintain adequate oxygenation                - Encourage use of IS to promote lung expansion and prevent PN                - Monitor ABGs to assess oxygenation status                - Monitor blood oxygen level to maintain adequate oxygenation                - Encourage cough and deep breathing exercises to promote lung expansion                - Monitor patient's mental status for increased confusion    7/2/2025 1158 by Tatiana Williamson LPN  Outcome: Progressing  7/2/2025 1158 by Tatiana Williamson LPN  Outcome: Progressing     Problem: Excess Fluid  Volume  Goal: Patient is able to achieve and maintain homeostasis  Description: INTERVENTIONS: Monitor for sign and symptoms of fluid overload  - Evaluate LE edema every shift  - Elevate LE to prevent dependent edema  - Apply COLLIN stockings as ordered   - Monitor ankle circumference daily  - Assess for jugular vein distention  - Evaluate provider orders for the CHF diuretic algorithm. Administer diuretics as ordered  - Weigh the patient daily at 0600 and report a weight gain of five pounds or more   - Strict intake and output  - Monitor fluid intake and adhere to fluid restrictions  - Assess lung sounds every shift and as needed  - Monitor vital signs and lab values (CBC, chem, BUN, BNP)  - Measure and document urine output    7/2/2025 1158 by Tatiana Williamson LPN  Outcome: Progressing  7/2/2025 1158 by Tatiana Williamson LPN  Outcome: Progressing     Problem: Activity Intolerance  Goal: Patient is able to perform activities within their limitations  Description: INTERVENTIONS:                       -   Alternate periods of activity with periods of rest                 -   Patients is able to maintain normal vitals heart rhythm during activity                 -   Gradually increase activity and exercise as patient can tolerate                 -   Monitor blood pressure and heart before and after exercise                  -   Monitor blood oxygen saturation during activity and apply oxygen as needed    7/2/2025 1158 by Tatiana iWlliamson LPN  Outcome: Progressing  7/2/2025 1158 by Tatiana Williamson LPN  Outcome: Progressing     Problem: Knowledge Deficit  Goal: Patient is able to verbalize understanding of Heart Failure after education  Description: INTERVENTIONS:  - Educate the patient and family on signs and symptoms of HF  - Provide the patient with HF education and HF zone tool  - Educate on the importance of daily weight in the AM and reporting a weight gain               of 3 or more pounds to their primary  care physician  - Monitor for SOB  - Maintain and sodium and fluid restriction  - Educate the patient on the importance of medications such as: diuretics, betablockers,               antiarrhythmics and their purpose, dose, route, side effects and labs               if they are needed    7/2/2025 1158 by Tatiana Williamson LPN  Outcome: Progressing  7/2/2025 1158 by Tatiana Williamson LPN  Outcome: Progressing

## 2025-07-02 NOTE — ASSESSMENT & PLAN NOTE
Management per primary service.  Follows with oncology at [facility] and recently started trabectedin (6/24/2025).

## 2025-07-02 NOTE — ASSESSMENT & PLAN NOTE
Lab Results   Component Value Date     (H) 07/02/2025     (H) 07/01/2025     (H) 06/30/2025     (H) 07/02/2025     (H) 07/01/2025     (H) 06/30/2025      Patient presented with elevated liver enzymes  POA  and , currently downtrending  Per GI team was in touch with Dr. Barakat [facility] oncology suspected care for therapy induced daily.  Patient might require dose adjustment in association with steroids chemo management.  Monitor daily CMP  Follow-up with GI recommendation.  Continue to monitor

## 2025-07-02 NOTE — ASSESSMENT & PLAN NOTE
Management per primary service.  Follows with oncology at [facility] and recently started trabectedin (6/24/2025).

## 2025-07-02 NOTE — ASSESSMENT & PLAN NOTE
At baseline, patient has normal LFTs.  Last MRI performed at [facility] was in June 2025 revealing increase size and number of multiple small liver metastasis as compared to exam performed in January 2025.  On admission, LFTs were as follows: , , alkaline phosphatase 206, total bilirubin 1.75.  Liver enzymes slightly decreased today.  INR 1.31.  Platelet count 123,000.  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  According to liver tox database, this has a likelihood score of C meaning probable cause of clinical apparent liver injury, generally in the setting of pre-existing liver disease and high doses.  According to the database, severity of liver injury ranges from mild elevations to clinically apparent liver injury with jaundice and hepatic failure.  According to prescribing information, the drug is also known to cause renal dysfunction or failure.  Fortunately his creatinine is improving.  Fortunately, patient's liver enzymes are trending downward today.  His creatinine is also improving.  His pulmonary function is not back to baseline as of yet.  Updated right upper quadrant ultrasound given patient's PATRICIA does not show dilated bile ducts. Spoke with patient's primary oncologist, Dr. Maki. We appreciate his recommendations.   -Trend CMP, platelet count and INR daily  -If liver enzymes continue to uptrend and/or INR, would need to consider steroids  -Full liver workup as ordered  -Avoid other hepatotoxic drugs  -Avoid hypotension  -Monitor mental status closely

## 2025-07-02 NOTE — PLAN OF CARE
Problem: PAIN - ADULT  Goal: Verbalizes/displays adequate comfort level or baseline comfort level  Description: Interventions:  - Encourage patient to monitor pain and request assistance  - Assess pain using appropriate pain scale  - Administer analgesics as ordered based on type and severity of pain and evaluate response  - Implement non-pharmacological measures as appropriate and evaluate response  - Consider cultural and social influences on pain and pain management  - Notify physician/advanced practitioner if interventions unsuccessful or patient reports new pain  - Educate patient/family on pain management process including their role and importance of  reporting pain   - Provide non-pharmacologic/complimentary pain relief interventions  Outcome: Progressing     Problem: INFECTION - ADULT  Goal: Absence or prevention of progression during hospitalization  Description: INTERVENTIONS:  - Assess and monitor for signs and symptoms of infection  - Monitor lab/diagnostic results  - Monitor all insertion sites, i.e. indwelling lines, tubes, and drains  - Monitor endotracheal if appropriate and nasal secretions for changes in amount and color  - [place] appropriate cooling/warming therapies per order  - Administer medications as ordered  - Instruct and encourage patient and family to use good hand hygiene technique  - Identify and instruct in appropriate isolation precautions for identified infection/condition  Outcome: Progressing  Goal: Absence of fever/infection during neutropenic period  Description: INTERVENTIONS:  - Monitor WBC  - Perform strict hand hygiene  - Limit to healthy visitors only  - No plants, dried, fresh or silk flowers with lopez in patient room  Outcome: Progressing     Problem: Knowledge Deficit  Goal: Patient/family/caregiver demonstrates understanding of disease process, treatment plan, medications, and discharge instructions  Description: Complete learning assessment and assess knowledge  base.  Interventions:  - Provide teaching at level of understanding  - Provide teaching via preferred learning methods  Outcome: Progressing     Problem: Decreased Cardiac Output  Goal: Cardiac output adequate for individual needs  Description: INTERVENTIONS: Monitor for signs and symptoms of decreased cardiac output   - Monitor for dyspnea with exertion and at rest  - Monitor for orthopnea  - Monitor for signs of tachycardia. Place patient on telemetry monitoring.  - Assess patient for jugular vein distention  - Assess patient for lower extremity edema and poor peripheral perfusion   - Auscultate lung sound for Fine bibasilar crackles   - Monitor for cardiac arrythmias   - Administer beta blockers, antiarrhythmic, and blood pressure medications as ordered    Outcome: Progressing     Problem: Impaired Gas Exchange  Goal: Optimize oxygenation and ensure adequate ventilation  Description: INTERVENTIONS: Monitor for signs and symptoms of respiratory distress                - Elevate HOB or use high fowlers to promote lung expansion                - Administer oxygen as ordered to maintain adequate oxygenation                - Encourage use of IS to promote lung expansion and prevent PN                - Monitor ABGs to assess oxygenation status                - Monitor blood oxygen level to maintain adequate oxygenation                - Encourage cough and deep breathing exercises to promote lung expansion                - Monitor patient's mental status for increased confusion    Outcome: Progressing     Problem: Excess Fluid Volume  Goal: Patient is able to achieve and maintain homeostasis  Description: INTERVENTIONS: Monitor for sign and symptoms of fluid overload  - Evaluate LE edema every shift  - Elevate LE to prevent dependent edema  - Apply COLLIN stockings as ordered   - Monitor ankle circumference daily  - Assess for jugular vein distention  - Evaluate provider orders for the CHF diuretic algorithm. Administer  diuretics as ordered  - Weigh the patient daily at 0600 and report a weight gain of five pounds or more   - Strict intake and output  - Monitor fluid intake and adhere to fluid restrictions  - Assess lung sounds every shift and as needed  - Monitor vital signs and lab values (CBC, chem, BUN, BNP)  - Measure and document urine output    Outcome: Progressing     Problem: Knowledge Deficit  Goal: Patient is able to verbalize understanding of Heart Failure after education  Description: INTERVENTIONS:  - Educate the patient and family on signs and symptoms of HF  - Provide the patient with HF education and HF zone tool  - Educate on the importance of daily weight in the AM and reporting a weight gain               of 3 or more pounds to their primary care physician  - Monitor for SOB  - Maintain and sodium and fluid restriction  - Educate the patient on the importance of medications such as: diuretics, betablockers,               antiarrhythmics and their purpose, dose, route, side effects and labs               if they are needed    Outcome: Progressing

## 2025-07-02 NOTE — ASSESSMENT & PLAN NOTE
At baseline, patient has normal LFTs.  Last MRI performed at [facility] was in June 2025 revealing increase size and number of multiple small liver metastasis as compared to exam performed in January 2025.  On admission, LFTs were as follows: , , alkaline phosphatase 206, total bilirubin 1.75.  Liver enzymes slightly decreased today.  INR 1.31.  Platelet count 123,000.  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  According to liver tox database, this has a likelihood score of C meaning probable cause of clinical apparent liver injury, generally in the setting of pre-existing liver disease and high doses.  According to the database, severity of liver injury ranges from mild elevations to clinically apparent liver injury with jaundice and hepatic failure.  According to prescribing information, the drug is also known to cause renal dysfunction or failure.  Fortunately his creatinine is improving.  Fortunately, patient's liver enzymes are trending downward today.  His creatinine is also improving.  His pulmonary function is not back to baseline as of yet.  Updated right upper quadrant ultrasound given patient's PATRICIA does not show dilated bile ducts. Spoke with patient's primary oncologist, Dr. Maki. We appreciate his recommendations.   -Trend CMP, platelet count and INR daily  -If liver enzymes continue to uptrend and/or INR, would need to consider steroids  -Full liver workup as ordered  -Avoid other hepatotoxic drugs  -Avoid hypotension  -Monitor mental status closely

## 2025-07-02 NOTE — PROGRESS NOTES
Progress Note - Gastroenterology   Name: Hernando Burkett 79 y.o. 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  Last MRI performed at [facility] was in June 2025 revealing increase size and number of multiple small liver metastasis as compared to exam performed in January 2025.  On admission, LFTs were as follows: , , alkaline phosphatase 206, total bilirubin 1.75.  Liver enzymes slightly decreased today.  INR 1.31.  Platelet count 123,000.  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  According to liver tox database, this has a likelihood score of C meaning probable cause of clinical apparent liver injury, generally in the setting of pre-existing liver disease and high doses.  According to the database, severity of liver injury ranges from mild elevations to clinically apparent liver injury with jaundice and hepatic failure.  According to prescribing information, the drug is also known to cause renal dysfunction or failure.  Fortunately his creatinine is improving.  Fortunately, patient's liver enzymes are trending downward today.  His creatinine is also improving.  His pulmonary function is not back to baseline as of yet.  Updated right upper quadrant ultrasound given patient's PATRICIA does not show dilated bile ducts. Spoke with patient's primary oncologist, Dr. Maki. We appreciate his recommendations.   -Trend CMP, platelet count and INR daily  -If liver enzymes continue to uptrend and/or INR, would need to consider steroids  -Full liver workup as ordered  -Avoid other hepatotoxic drugs  -Avoid hypotension  -Monitor mental status closely    I have discussed the above management plan in  detail with the primary service.     Subjective   Patient feels he did not urinate as much over the last 12 hours compared to previous. We went over labs this AM. His wife was available on speaker phone. No other new complaints.     Objective :  Temp:  [97.8 °F (36.6 °C)-98.9 °F (37.2 °C)] 98.4 °F (36.9 °C)  HR:  [82-92] 87  BP: ()/(69-74) 107/71  Resp:  [16-18] 18  SpO2:  [87 %-96 %] 96 %  O2 Device: None (Room air)  Nasal Cannula O2 Flow Rate (L/min):  [2 L/min] 2 L/min    Physical Exam  HENT:      Head: Normocephalic and atraumatic.   Pulmonary:      Effort: Pulmonary effort is normal.      Comments: On 2 L O2  Abdominal:      Palpations: Abdomen is soft.     Skin:     General: Skin is warm and dry.     Neurological:      Mental Status: He is alert.         Lab Results: I have reviewed the following results:

## 2025-07-02 NOTE — ASSESSMENT & PLAN NOTE
Management per primary service.  Follows with oncology at [facility] and recently started trabectedin (6/24/2025).

## 2025-07-02 NOTE — ASSESSMENT & PLAN NOTE
Management per primary service.  Follows with oncology at [facility] and recently started trabectedin (6/24/2025).

## 2025-07-02 NOTE — PLAN OF CARE
Problem: PAIN - ADULT  Goal: Verbalizes/displays adequate comfort level or baseline comfort level  Description: Interventions:  - Encourage patient to monitor pain and request assistance  - Assess pain using appropriate pain scale  - Administer analgesics as ordered based on type and severity of pain and evaluate response  - Implement non-pharmacological measures as appropriate and evaluate response  - Consider cultural and social influences on pain and pain management  - Notify physician/advanced practitioner if interventions unsuccessful or patient reports new pain  - Educate patient/family on pain management process including their role and importance of  reporting pain   - Provide non-pharmacologic/complimentary pain relief interventions  Outcome: Progressing     Problem: INFECTION - ADULT  Goal: Absence or prevention of progression during hospitalization  Description: INTERVENTIONS:  - Assess and monitor for signs and symptoms of infection  - Monitor lab/diagnostic results  - Monitor all insertion sites, i.e. indwelling lines, tubes, and drains  - Monitor endotracheal if appropriate and nasal secretions for changes in amount and color  - Fries appropriate cooling/warming therapies per order  - Administer medications as ordered  - Instruct and encourage patient and family to use good hand hygiene technique  - Identify and instruct in appropriate isolation precautions for identified infection/condition  Outcome: Progressing  Goal: Absence of fever/infection during neutropenic period  Description: INTERVENTIONS:  - Monitor WBC  - Perform strict hand hygiene  - Limit to healthy visitors only  - No plants, dried, fresh or silk flowers with lopez in patient room  Outcome: Progressing     Problem: DISCHARGE PLANNING  Goal: Discharge to home or other facility with appropriate resources  Description: INTERVENTIONS:  - Identify barriers to discharge w/patient and caregiver  - Arrange for needed discharge resources  and transportation as appropriate  - Identify discharge learning needs (meds, wound care, etc.)  - Arrange for interpretive services to assist at discharge as needed  - Refer to Case Management Department for coordinating discharge planning if the patient needs post-hospital services based on physician/advanced practitioner order or complex needs related to functional status, cognitive ability, or social support system  Outcome: Progressing     Problem: Knowledge Deficit  Goal: Patient/family/caregiver demonstrates understanding of disease process, treatment plan, medications, and discharge instructions  Description: Complete learning assessment and assess knowledge base.  Interventions:  - Provide teaching at level of understanding  - Provide teaching via preferred learning methods  Outcome: Progressing     Problem: Decreased Cardiac Output  Goal: Cardiac output adequate for individual needs  Description: INTERVENTIONS: Monitor for signs and symptoms of decreased cardiac output   - Monitor for dyspnea with exertion and at rest  - Monitor for orthopnea  - Monitor for signs of tachycardia. Place patient on telemetry monitoring.  - Assess patient for jugular vein distention  - Assess patient for lower extremity edema and poor peripheral perfusion   - Auscultate lung sound for Fine bibasilar crackles   - Monitor for cardiac arrythmias   - Administer beta blockers, antiarrhythmic, and blood pressure medications as ordered    Outcome: Progressing     Problem: Impaired Gas Exchange  Goal: Optimize oxygenation and ensure adequate ventilation  Description: INTERVENTIONS: Monitor for signs and symptoms of respiratory distress                - Elevate HOB or use high fowlers to promote lung expansion                - Administer oxygen as ordered to maintain adequate oxygenation                - Encourage use of IS to promote lung expansion and prevent PN                - Monitor ABGs to assess oxygenation status                 - Monitor blood oxygen level to maintain adequate oxygenation                - Encourage cough and deep breathing exercises to promote lung expansion                - Monitor patient's mental status for increased confusion    Outcome: Progressing     Problem: Excess Fluid Volume  Goal: Patient is able to achieve and maintain homeostasis  Description: INTERVENTIONS: Monitor for sign and symptoms of fluid overload  - Evaluate LE edema every shift  - Elevate LE to prevent dependent edema  - Apply COLLIN stockings as ordered   - Monitor ankle circumference daily  - Assess for jugular vein distention  - Evaluate provider orders for the CHF diuretic algorithm. Administer diuretics as ordered  - Weigh the patient daily at 0600 and report a weight gain of five pounds or more   - Strict intake and output  - Monitor fluid intake and adhere to fluid restrictions  - Assess lung sounds every shift and as needed  - Monitor vital signs and lab values (CBC, chem, BUN, BNP)  - Measure and document urine output    Outcome: Progressing     Problem: Knowledge Deficit  Goal: Patient is able to verbalize understanding of Heart Failure after education  Description: INTERVENTIONS:  - Educate the patient and family on signs and symptoms of HF  - Provide the patient with HF education and HF zone tool  - Educate on the importance of daily weight in the AM and reporting a weight gain               of 3 or more pounds to their primary care physician  - Monitor for SOB  - Maintain and sodium and fluid restriction  - Educate the patient on the importance of medications such as: diuretics, betablockers,               antiarrhythmics and their purpose, dose, route, side effects and labs               if they are needed    Outcome: Progressing

## 2025-07-02 NOTE — PHYSICAL THERAPY NOTE
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 Per chart review pt is scoring a 24 on the [facility]. Spoke with hansing who reports the pt is independent with mobility within the room. Spoke with pt who reports they are at [facility] mobility and feels safe returning to home. At this time pt with no acute PT needs, d/c PT services. Please reconsult if needs arise.     Go Bass, DPT , PT        [1]   Past Medical History:  Diagnosis Date    BPH (benign prostatic hyperplasia)     Diverticulitis    [2]   Past Surgical History:  Procedure Laterality Date    BOWEL RESECTION

## 2025-07-02 NOTE — PROGRESS NOTES
Progress Note - Hospitalist   Name: Hernando Burkett 79 y.o. male I MRN: [id number]  Unit/Bed#: -01 I Date of Admission: 6/29/2025   Date of Service: 7/2/2025 I Hospital Day: 3    Assessment & Plan  Shortness of breath  Patient with PMH of hypertension, A-fib, hyperlipidemia, metastatic leiomyosarcoma from the left lower extremity with metastasis to the liver and bone who presents with shortness of breath.   He has a history of metastatic pleomorphic leiomyosarcoma, initially treated with doxorubicin 6- 8/2023, palliative RT to the spine 10/2023, and on pazopanib from 06/2024-06/2025 when he was recently found to have progression in the liver. He started trabectedin with C1D1 06/24/2025.    Patient reported shortness of breath for the last few days and was getting worse after he got his new chemotherapy.  Labs creatinine 1.89, AST 3013, , troponin 79> 73, , platelet count 141.  Mildly elevated troponin likely due to nonischemic myocardial injury due to volume overload.  CT PE study showed No pulmonary embolism. Bilateral moderate pleural effusions and associated compressive atelectasis of both lower lobes. Pulmonary nodules as above. These are suspicious for metastatic disease given history. There is suggestion of numerous small ill-defined liver lesions suspicious for metastases.  Echo done showing EF of 55% with systolic function normal, LA and RA mildly dilated, mild to moderate regurg of AV, MV, TV.  Plan:  Currently afebrile, ill-appearing however acutely nontoxic appearing.  Reports symptoms improvement with Lasix.  Currently O2 saturation 92 to 93% on room air  Continue IV Lasix 40 mg daily.  Given persistent large pleural effusion recommended proceeding with horacentesis for management purposes and also diagnostic. Patient and his wife more comfortable now proceeding with thoracentesis after discussing also with cardiology team.  Consulted IR to assist with thoracentesis.  Per cardiology  patient does not appear to be in acute heart failure and likely symptoms in the setting of pleural effusion.  Monitor urine output.  Acute hypoxic respiratory failure (HCC)  Patient was hypoxic in the ED  Patient required 2 L of oxygen in the setting of bilateral pleural effusion  Continue to monitor oxygen saturation  CAT scan report reviewed noted with bilateral moderate pleural effusion associated with compressive atelectasis in both lower lobes.  Responding well to diuretics Continue IV Lasix 40 mg daily.  Tentative for IR thoracentesis with fluid drainage.  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 He started trabectedin with C1D1 06/24/2025.  CT PE study showed No pulmonary embolism. Bilateral moderate pleural effusions and associated compressive atelectasis of both lower lobes. Pulmonary nodules as above. These are suspicious for metastatic disease given history. There is suggestion of numerous small ill-defined liver lesions suspicious for metastases.  Patient follows up with heme-onc outpatient at [facility].  GI consult.  Permanent atrial fibrillation  Patient with history of A-fib on Xarelto  Patient on metoprolol succinate 50 in the morning and 100 at night  Continue current regimen  Bilateral pleural effusion  See management as above  Transaminitis  Lab Results   Component Value Date     (H) 07/02/2025     (H) 07/01/2025     (H) 06/30/2025     (H) 07/02/2025     (H) 07/01/2025     (H) 06/30/2025      Patient presented with elevated liver enzymes  POA  and , currently downtrending  Per GI team was in touch with Dr. Barakat [facility] oncology suspected care for therapy induced daily.  Patient might require dose  adjustment in association with steroids chemo management.  Monitor daily CMP  Follow-up with GI recommendation.  Continue to monitor  PATRICIA (acute kidney injury) (HCC)  Patient creatinine presentation 1.89  Baseline 0.9  Currently 1.71 improved with IV Lasix.  Can be side effect of new chemotherapy.  Continue monitoring kidney function being on IV Lasix.  Hypertension  Continue amlodipine 2.5 and metoprolol succinate  BPH (benign prostatic hyperplasia)  Continue finasteride    VTE Pharmacologic Prophylaxis:   Moderate Risk (Score 3-4) - Pharmacological DVT Prophylaxis Ordered: rivaroxaban (Xarelto).    Mobility:   Basic Mobility Inpatient Raw Score: 24  JH-HLM Goal: 8: Walk 250 feet or more  JH-HLM Achieved: 8: Walk 250 feet ot more  JH-HLM Goal achieved. Continue to encourage appropriate mobility.    Patient Centered Rounds: I performed bedside rounds with nursing staff today.   Discussions with Specialists or Other Care Team Provider: Discussed with cardiology team    Education and Discussions with Family / Patient: Updated  (wife) at bedside.    Current Length of Stay: 3 day(s)  Current Patient Status: Inpatient   Certification Statement: The patient will continue to require additional inpatient hospital stay due to acute respiratory failure in the setting of pleural effusion  Discharge Plan: Anticipate discharge in 48-72 hrs to home.    Code Status: Level 1 - Full Code    Subjective   Patient admits that symptoms improving.  Denies significant shortness of breath, chest pain    Objective :  Temp:  [97.4 °F (36.3 °C)-98.9 °F (37.2 °C)] 97.4 °F (36.3 °C)  HR:  [82-92] 84  BP: (102-107)/(69-71) 107/69  Resp:  [16-18] 17  SpO2:  [87 %-96 %] 94 %  O2 Device: None (Room air)  Nasal Cannula O2 Flow Rate (L/min):  [2 L/min] 2 L/min    Body mass index is 27.76 kg/m².     Input and Output Summary (last 24 hours):     Intake/Output Summary (Last 24 hours) at 7/2/2025 9452  Last data filed at 7/2/2025  1539  Gross per 24 hour   Intake 1140 ml   Output 1075 ml   Net 65 ml       Physical Exam  Vitals and nursing note reviewed.   Constitutional:       General: He is not in acute distress.     Appearance: He is well-developed.   HENT:      Head: Normocephalic and atraumatic.     Eyes:      Conjunctiva/sclera: Conjunctivae normal.       Cardiovascular:      Rate and Rhythm: Normal rate and regular rhythm.      Heart sounds: No murmur heard.  Pulmonary:      Effort: Pulmonary effort is normal. No respiratory distress.      Breath sounds: Normal breath sounds.   Abdominal:      Palpations: Abdomen is soft.      Tenderness: There is no abdominal tenderness.     Musculoskeletal:         General: No swelling.      Cervical back: Neck supple.      Right lower leg: No edema.      Left lower leg: No edema.     Skin:     General: Skin is warm and dry.      Capillary Refill: Capillary refill takes less than 2 seconds.     Neurological:      Mental Status: He is alert.     Psychiatric:         Mood and Affect: Mood normal.           Lines/Drains:  Lines/Drains/Airways       Active Status       Name Placement date Placement time Site Days    Port A Cath 04/29/23 Right Subclavian 04/29/23  1721  Subclavian  794                    Central Line:  Goal for removal: Port accessed. Will de-access as appropriate.         Telemetry:  Telemetry Orders (From admission, onward)               24 Hour Telemetry Monitoring  Continuous x 24 Hours (Telem)        Expiring   Question:  Reason for 24 Hour Telemetry  Answer:  Decompensated CHF- and any one of the following: continuous diuretic infusion or total diuretic dose >200 mg daily, associated electrolyte derangement (I.e. K < 3.0), inotropic drip (continuous infusion), hx of ventricular arrhythmia, or new EF < 35%                     Telemetry Reviewed: Normal Sinus Rhythm  Indication for Continued Telemetry Use: No indication for continued use. Will discontinue.                Lab Results: I  have reviewed the following results:   Results from last 7 days   Lab Units 07/02/25  0453   WBC Thousand/uL 5.62   HEMOGLOBIN g/dL 12.5   HEMATOCRIT % 36.8   PLATELETS Thousands/uL 125*   SEGS PCT % 76*   LYMPHO PCT % 13*   MONO PCT % 6   EOS PCT % 4     Results from last 7 days   Lab Units 07/02/25  0453   SODIUM mmol/L 137   POTASSIUM mmol/L 4.3   CHLORIDE mmol/L 105   CO2 mmol/L 26   BUN mg/dL 36*   CREATININE mg/dL 1.50*   ANION GAP mmol/L 6   CALCIUM mg/dL 7.7*   ALBUMIN g/dL 3.1*   TOTAL BILIRUBIN mg/dL 1.10*   ALK PHOS U/L 199*   ALT U/L 276*   AST U/L 189*   GLUCOSE RANDOM mg/dL 90     Results from last 7 days   Lab Units 07/02/25  0815   INR  1.57*             Results from last 7 days   Lab Units 06/29/25  1151   LACTIC ACID mmol/L 1.3       Recent Cultures (last 7 days):         Imaging Results Review: I reviewed radiology reports from this admission including: Echocardiogram.  Other Study Results Review: No additional pertinent studies reviewed.    Last 24 Hours Medication List:     Current Facility-Administered Medications:     atorvastatin (LIPITOR) tablet 10 mg, Daily With Dinner    finasteride (PROSCAR) tablet 5 mg, Daily    [START ON 7/3/2025] furosemide (LASIX) injection 40 mg, Daily    metoprolol tartrate (LOPRESSOR) tablet 50 mg, Q12H TATE    rivaroxaban (XARELTO) tablet 20 mg, Daily With Dinner    Administrative Statements   Today, Patient Was Seen By: Rachael Hunt MD      **Please Note: This note may have been constructed using a voice recognition system.**

## 2025-07-02 NOTE — ASSESSMENT & PLAN NOTE
He has a history of metastatic pleomorphic leiomyosarcoma, initially treated with doxorubicin 6- 8/2023, palliative RT to the spine 10/2023, and on pazopanib from 06/2024-06/2025 when he was recently found to have progression in the liver. He started trabectedin with C1D1 06/24/2025.  CT PE study showed No pulmonary embolism. Bilateral moderate pleural effusions and associated compressive atelectasis of both lower lobes. Pulmonary nodules as above. These are suspicious for metastatic disease given history. There is suggestion of numerous small ill-defined liver lesions suspicious for metastases.  Patient follows up with heme-onc outpatient at [facility].  GI consult.

## 2025-07-02 NOTE — ASSESSMENT & PLAN NOTE
At baseline, patient has normal LFTs.  Last MRI performed at [facility] was in June 2025 revealing increase size and number of multiple small liver metastasis as compared to exam performed in January 2025.  On admission, LFTs were as follows: , , alkaline phosphatase 206, total bilirubin 1.75.  Liver enzymes slightly decreased today.  INR 1.31.  Platelet count 123,000.  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  According to liver tox database, this has a likelihood score of C meaning probable cause of clinical apparent liver injury, generally in the setting of pre-existing liver disease and high doses.  According to the database, severity of liver injury ranges from mild elevations to clinically apparent liver injury with jaundice and hepatic failure.  According to prescribing information, the drug is also known to cause renal dysfunction or failure.  Fortunately his creatinine is improving.  Fortunately, patient's liver enzymes are trending downward today.  His creatinine is also improving.  His pulmonary function is not back to baseline as of yet.  Updated right upper quadrant ultrasound given patient's PATRICIA does not show dilated bile ducts. Spoke with patient's primary oncologist, Dr. Maki. We appreciate his recommendations.   -Trend CMP, platelet count and INR daily  -If liver enzymes continue to uptrend and/or INR, would need to consider steroids  -Full liver workup as ordered  -Avoid other hepatotoxic drugs  -Avoid hypotension  -Monitor mental status closely

## 2025-07-02 NOTE — ASSESSMENT & PLAN NOTE
BP has actually been running borderline soft.  Discontinue amlodipine.  Transition to Lopressor as per above.

## 2025-07-02 NOTE — CONSULTS
Consultation - Cardiology   Hernando Burkett 79 y.o. male MRN: [id number]  Unit/Bed#: -01 Encounter: [id number]  07/02/25  9:30 AM    Assessment & Plan  Shortness of breath  Clinically does not appear to be in acute heart failure.  On IV Lasix per primary service in setting of BL pleural effusion; hold parameters reduced.  Repeat CXR pending.  I/O net -880 mL.  May be reasonable to consider IR thoracentesis.  Dyspnea also noted to be a possible adverse effect of trabectedin.    Permanent atrial fibrillation  HR is well-controlled.  Transition to Lopressor 50 mg bid.  [id number] at least 3, continue Xarelto 20 mg daily.  Follows with primary cardiologist at [facility], however has not followed up in several years.  Hypertension  BP has actually been running borderline soft.  Discontinue amlodipine.  Transition to Lopressor as per above.  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  Follows with oncology at [facility] and recently started trabectedin (6/24/2025).        History of Present Illness   Physician Requesting Consult: Rachael Hunt MD    Reason for Consult / Principal Problem: Shortness of breath    HPI: Hernando Burkett is a 79 y.o. year old male with history of permanent atrial fibrillation on Xarelto, hypertension, and metastatic leiomyosarcoma who presents with worsening shortness of breath after starting new chemotherapy treatment.  Patient found to have bilateral pleural effusions on imaging prompting initiation of IV Lasix.  Cardiology consulted for further evaluation and management.  Patient denies any additional complaints at this time.  Follows with oncology at [facility].  Follows with primary cardiologist at [facility], however has not followed up in several years.    Inpatient consult to Cardiology  Consult performed by:  "Trevor Sellers PA-C  Consult ordered by: Rachael Hutn MD          Review of Systems   Constitutional:  Negative for chills and fever.   HENT:  Negative for ear pain and sore throat.    Eyes:  Negative for pain and visual disturbance.   Respiratory:  Positive for shortness of breath. Negative for cough.    Cardiovascular:  Negative for chest pain, palpitations and leg swelling.   Gastrointestinal:  Negative for abdominal pain and vomiting.   Genitourinary:  Negative for dysuria and hematuria.   Musculoskeletal:  Negative for arthralgias and back pain.   Skin:  Negative for color change and rash.   Neurological:  Negative for seizures and syncope.   All other systems reviewed and are negative.      Historical Information   Past Medical History[1]  Past Surgical History[2]  Social History     Substance and Sexual Activity   Alcohol Use Yes    Comment: Social      Social History     Substance and Sexual Activity   Drug Use No     Tobacco Use History[3]    Family History: Family History[4]    Meds/Allergies   all current active meds have been reviewed  Allergies[5]    Objective   Vitals: Blood pressure 107/71, pulse 87, temperature 98.4 °F (36.9 °C), temperature source Oral, resp. rate 18, height 5' 9\" (1.753 m), weight 85.3 kg (188 lb), SpO2 96%., Body mass index is 27.76 kg/m².,   Orthostatic Blood Pressures      Flowsheet Row Most Recent Value   Blood Pressure 107/71 filed at 2025 0912   Patient Position - Orthostatic VS Lying filed at 2025 0729            Systolic (24hrs), Av , Min:93 , Max:128     Diastolic (24hrs), Av, Min:69, Max:74        Intake/Output Summary (Last 24 hours) at 2025 0930  Last data filed at 2025 0601  Gross per 24 hour   Intake 540 ml   Output 1250 ml   Net -710 ml       Invasive Devices       Central Venous Catheter Line  Duration             Port A Cath 23 Right Subclavian 794 days              Peripheral Intravenous Line  Duration             " Peripheral IV 06/29/25 Proximal;Right;Ventral (anterior) Forearm 2 days                        Physical Exam:  GEN: Alert and oriented x3, in no acute distress.  Well appearing and well nourished.   HEENT: Sclera anicteric, conjunctivae pink, mucous membranes moist. Oropharynx clear.   NECK: Supple, no carotid bruits, no significant JVD. Trachea midline, no thyromegaly.   HEART: Regular rhythm, normal S1 and S2, no murmurs, clicks, gallops or rubs. PMI nondisplaced, no thrills.   LUNGS: Clear to auscultation bilaterally; no wheezes, rales, or rhonchi. No increased work of breathing or signs of respiratory distress.   ABDOMEN: Soft, nontender, nondistended, normoactive bowel sounds.   EXTREMITIES: Skin warm and well perfused, no clubbing or cyanosis, no edema.  NEURO: No focal findings. Normal speech. Mood and affect normal.   SKIN: Normal without suspicious lesions on exposed skin.    Lab Results:     Cardiac Profile:   Results from last 7 days   Lab Units 06/30/25  0217 06/30/25  0013 06/29/25  2219 06/29/25  1717 06/29/25  1441 06/29/25  1151   HS TNI 0HR ng/L  --   --  95*  --   --  79*   HS TNI 2HR ng/L  --  91*  --   --  73*  --    HSTNI D2 ng/L  --  -4  --   --  -6  --    HS TNI 4HR ng/L 93*  --   --  90*  --   --    HSTNI D4 ng/L -2  --   --  11  --   --        CBC with diff:   Results from last 7 days   Lab Units 07/02/25  0453 07/01/25  0543 06/30/25  0534 06/29/25  1151   WBC Thousand/uL 5.62 7.25 6.60 7.33   HEMOGLOBIN g/dL 12.5 12.9 12.4 13.1   HEMATOCRIT % 36.8 38.3 36.8 39.5   MCV fL 99* 101* 101* 102*   PLATELETS Thousands/uL 125* 124* 123* 141*   RBC Million/uL 3.71* 3.79* 3.63* 3.89   MCH pg 33.7 34.0 34.2 33.7   MCHC g/dL 34.0 33.7 33.7 33.2   RDW % 15.2* 15.5* 15.7* 15.9*   MPV fL 11.1 11.0 10.7 10.8   NRBC AUTO /100 WBCs 0  --  0 0         CMP:   Results from last 7 days   Lab Units 07/02/25  0453 07/01/25  0543 06/30/25  0534 06/29/25  1151   POTASSIUM mmol/L 4.3 4.4 5.0 5.1   CHLORIDE mmol/L  105 104 104 102   CO2 mmol/L 26 25 25 25   BUN mg/dL 36* 38* 43* 49*   CREATININE mg/dL 1.50* 1.59* 1.72* 1.89*   CALCIUM mg/dL 7.7* 7.8* 7.6* 8.2*   AST U/L 189* 257* 286* 313*   ALT U/L 276* 343* 348* 364*   ALK PHOS U/L 199* 218* 183* 206*   EGFR ml/min/1.73sq m 43 40 37 33            [1]   Past Medical History:  Diagnosis Date    BPH (benign prostatic hyperplasia)     Diverticulitis    [2]   Past Surgical History:  Procedure Laterality Date    BOWEL RESECTION     [3]   Social History  Tobacco Use   Smoking Status Never   Smokeless Tobacco Never   [4]   Family History  Problem Relation Name Age of Onset    Diabetes Father      Hypertension Father      Hypertension Mother      Diabetes Mother     [5] No Known Allergies

## 2025-07-02 NOTE — ASSESSMENT & PLAN NOTE
Management per primary service.  Follows with oncology at [facility] and recently started trabectedin (6/24/2025).

## 2025-07-02 NOTE — ASSESSMENT & PLAN NOTE
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   He has a history of metastatic pleomorphic leiomyosarcoma, initially treated with doxorubicin 6- 8/2023, palliative RT to the spine 10/2023, and on pazopanib from 06/2024-06/2025 when he was recently found to have progression in the liver. He started trabectedin with C1D1 06/24/2025.    Patient reported shortness of breath for the last few days and was getting worse after he got his new chemotherapy.  Labs creatinine 1.89, AST 3013, , troponin 79> 73, , platelet count 141.  Mildly elevated troponin likely due to nonischemic myocardial injury due to volume overload.  CT PE study showed No pulmonary embolism. Bilateral moderate pleural effusions and associated compressive atelectasis of both lower lobes. Pulmonary nodules as above. These are suspicious for metastatic disease given history. There is suggestion of numerous small ill-defined liver lesions suspicious for metastases.  Echo done showing EF of 55% with systolic function normal, LA and RA mildly dilated, mild to moderate regurg of AV, MV, TV.  Plan:  Currently afebrile, ill-appearing however acutely nontoxic appearing.  Reports symptoms improvement with Lasix.  Currently O2 saturation 92 to 93% on room air  Continue IV Lasix 40 mg daily.  Given persistent large pleural effusion recommended proceeding with horacentesis for management purposes and also diagnostic. Patient and his wife more comfortable now proceeding with thoracentesis after discussing also with cardiology team.  Consulted IR to assist with thoracentesis.  Per cardiology patient does not appear to be in acute heart failure and likely symptoms in the setting of pleural effusion.  Monitor urine output.

## 2025-07-02 NOTE — ASSESSMENT & PLAN NOTE
Patient creatinine presentation 1.89  Baseline 0.9  Currently 1.71 improved with IV Lasix.  Can be side effect of new chemotherapy.  Continue monitoring kidney function being on IV Lasix.

## 2025-07-02 NOTE — ASSESSMENT & PLAN NOTE
HR is well-controlled.  Transition to Lopressor 50 mg bid.  [id number] at least 3, continue Xarelto 20 mg daily.  Follows with primary cardiologist at [facility], however has not followed up in several years.

## 2025-07-02 NOTE — ASSESSMENT & PLAN NOTE
Patient was hypoxic in the ED  Patient required 2 L of oxygen in the setting of bilateral pleural effusion  Continue to monitor oxygen saturation  CAT scan report reviewed noted with bilateral moderate pleural effusion associated with compressive atelectasis in both lower lobes.  Responding well to diuretics Continue IV Lasix 40 mg daily.  Tentative for IR thoracentesis with fluid drainage.

## 2025-07-02 NOTE — ASSESSMENT & PLAN NOTE
Management per primary service.  Follows with oncology at [facility] and recently started trabectedin (6/24/2025).

## 2025-07-02 NOTE — ASSESSMENT & PLAN NOTE
Clinically does not appear to be in acute heart failure.  On IV Lasix per primary service in setting of BL pleural effusion; hold parameters reduced.  Repeat CXR pending.  I/O net -880 mL.  May be reasonable to consider IR thoracentesis.  Dyspnea also noted to be a possible adverse effect of trabectedin.

## 2025-07-03 ENCOUNTER — APPOINTMENT (INPATIENT)
Dept: NON INVASIVE DIAGNOSTICS | Facility: HOSPITAL | Age: 80
DRG: 435 | End: 2025-07-03
Payer: MEDICARE

## 2025-07-03 ENCOUNTER — APPOINTMENT (INPATIENT)
Dept: NON INVASIVE DIAGNOSTICS | Facility: HOSPITAL | Age: 80
DRG: 435 | End: 2025-07-03
Attending: NURSE PRACTITIONER
Payer: MEDICARE

## 2025-07-03 PROBLEM — J96.01 ACUTE HYPOXIC RESPIRATORY FAILURE (HCC): Status: RESOLVED | Noted: 2025-06-29 | Resolved: 2025-07-03

## 2025-07-03 LAB
ALBUMIN SERPL BCG-MCNC: 3.2 G/DL (ref 3.5–5)
ALP SERPL-CCNC: 225 U/L (ref 34–104)
ALT SERPL W P-5'-P-CCNC: 260 U/L (ref 7–52)
ANION GAP SERPL CALCULATED.3IONS-SCNC: 6 MMOL/L (ref 4–13)
APPEARANCE FLD: CLEAR
AST SERPL W P-5'-P-CCNC: 177 U/L (ref 13–39)
BASOPHILS # BLD AUTO: 0.03 THOUSANDS/ÂΜL (ref 0–0.1)
BASOPHILS NFR BLD AUTO: 1 % (ref 0–1)
BILIRUB DIRECT SERPL-MCNC: 0.24 MG/DL (ref 0–0.2)
BILIRUB SERPL-MCNC: 0.96 MG/DL (ref 0.2–1)
BUN SERPL-MCNC: 33 MG/DL (ref 5–25)
CALCIUM SERPL-MCNC: 7.9 MG/DL (ref 8.4–10.2)
CHLORIDE SERPL-SCNC: 106 MMOL/L (ref 96–108)
CO2 SERPL-SCNC: 24 MMOL/L (ref 21–32)
COLOR FLD: YELLOW
CREAT SERPL-MCNC: 1.39 MG/DL (ref 0.6–1.3)
EOSINOPHIL # BLD AUTO: 0.17 THOUSAND/ÂΜL (ref 0–0.61)
EOSINOPHIL NFR BLD AUTO: 4 % (ref 0–6)
ERYTHROCYTE [DISTWIDTH] IN BLOOD BY AUTOMATED COUNT: 15.1 % (ref 11.6–15.1)
GFR SERPL CREATININE-BSD FRML MDRD: 48 ML/MIN/1.73SQ M
GLUCOSE FLD-MCNC: 112 MG/DL
GLUCOSE SERPL-MCNC: 90 MG/DL (ref 65–140)
HCT VFR BLD AUTO: 38.8 % (ref 36.5–49.3)
HGB BLD-MCNC: 12.7 G/DL (ref 12–17)
HISTIOCYTES NFR FLD: 47 %
IMM GRANULOCYTES # BLD AUTO: 0.05 THOUSAND/UL (ref 0–0.2)
IMM GRANULOCYTES NFR BLD AUTO: 1 % (ref 0–2)
INR PPP: 1.57 (ref 0.85–1.19)
LDH FLD L TO P-CCNC: 184 U/L
LYMPHOCYTES # BLD AUTO: 0.69 THOUSANDS/ÂΜL (ref 0.6–4.47)
LYMPHOCYTES NFR BLD AUTO: 15 % (ref 14–44)
LYMPHOCYTES NFR BLD AUTO: 3 %
MAGNESIUM SERPL-MCNC: 2 MG/DL (ref 1.9–2.7)
MCH RBC QN AUTO: 32.8 PG (ref 26.8–34.3)
MCHC RBC AUTO-ENTMCNC: 32.7 G/DL (ref 31.4–37.4)
MCV RBC AUTO: 100 FL (ref 82–98)
MONO+MESO NFR FLD MANUAL: 40 %
MONOCYTES # BLD AUTO: 0.25 THOUSAND/ÂΜL (ref 0.17–1.22)
MONOCYTES NFR BLD AUTO: 2 %
MONOCYTES NFR BLD AUTO: 5 % (ref 4–12)
NEUTROPHILS # BLD AUTO: 3.5 THOUSANDS/ÂΜL (ref 1.85–7.62)
NEUTS SEG NFR BLD AUTO: 74 % (ref 43–75)
NEUTS SEG NFR BLD AUTO: 8 %
NRBC BLD AUTO-RTO: 0 /100 WBCS
PH BODY FLUID: 7.7
PLATELET # BLD AUTO: 135 THOUSANDS/UL (ref 149–390)
PMV BLD AUTO: 11.1 FL (ref 8.9–12.7)
POTASSIUM SERPL-SCNC: 4.1 MMOL/L (ref 3.5–5.3)
PROT FLD-MCNC: <3 G/DL
PROT SERPL-MCNC: 5.4 G/DL (ref 6.4–8.4)
PROTHROMBIN TIME: 19.5 SECONDS (ref 12.3–15)
RBC # BLD AUTO: 3.87 MILLION/UL (ref 3.88–5.62)
SITE: NORMAL
SODIUM SERPL-SCNC: 136 MMOL/L (ref 135–147)
TOTAL CELLS COUNTED SPEC: 100
TOTAL NUCLEATED CELL COUNT: 420 /UL
TOTAL PROTEIN FLUID: 2.6 G/DL
WBC # BLD AUTO: 4.69 THOUSAND/UL (ref 4.31–10.16)

## 2025-07-03 PROCEDURE — 32555 ASPIRATE PLEURA W/ IMAGING: CPT | Performed by: NURSE PRACTITIONER

## 2025-07-03 PROCEDURE — 88112 CYTOPATH CELL ENHANCE TECH: CPT | Performed by: PATHOLOGY

## 2025-07-03 PROCEDURE — 83735 ASSAY OF MAGNESIUM: CPT

## 2025-07-03 PROCEDURE — 82945 GLUCOSE OTHER FLUID: CPT

## 2025-07-03 PROCEDURE — 88305 TISSUE EXAM BY PATHOLOGIST: CPT | Performed by: PATHOLOGY

## 2025-07-03 PROCEDURE — 80076 HEPATIC FUNCTION PANEL: CPT

## 2025-07-03 PROCEDURE — 84157 ASSAY OF PROTEIN OTHER: CPT

## 2025-07-03 PROCEDURE — 0W9B3ZZ DRAINAGE OF LEFT PLEURAL CAVITY, PERCUTANEOUS APPROACH: ICD-10-PCS | Performed by: RADIOLOGY

## 2025-07-03 PROCEDURE — 85025 COMPLETE CBC W/AUTO DIFF WBC: CPT

## 2025-07-03 PROCEDURE — 32555 ASPIRATE PLEURA W/ IMAGING: CPT

## 2025-07-03 PROCEDURE — 83615 LACTATE (LD) (LDH) ENZYME: CPT

## 2025-07-03 PROCEDURE — NC001 PR NO CHARGE: Performed by: NURSE PRACTITIONER

## 2025-07-03 PROCEDURE — 80048 BASIC METABOLIC PNL TOTAL CA: CPT

## 2025-07-03 PROCEDURE — 85610 PROTHROMBIN TIME: CPT | Performed by: PHYSICIAN ASSISTANT

## 2025-07-03 PROCEDURE — 83986 ASSAY PH BODY FLUID NOS: CPT

## 2025-07-03 PROCEDURE — 87070 CULTURE OTHR SPECIMN AEROBIC: CPT

## 2025-07-03 PROCEDURE — 99232 SBSQ HOSP IP/OBS MODERATE 35: CPT

## 2025-07-03 PROCEDURE — 89051 BODY FLUID CELL COUNT: CPT

## 2025-07-03 PROCEDURE — 87205 SMEAR GRAM STAIN: CPT

## 2025-07-03 PROCEDURE — 0W993ZZ DRAINAGE OF RIGHT PLEURAL CAVITY, PERCUTANEOUS APPROACH: ICD-10-PCS | Performed by: RADIOLOGY

## 2025-07-03 RX ORDER — LIDOCAINE WITH 8.4% SOD BICARB 0.9%(10ML)
SYRINGE (ML) INJECTION AS NEEDED
Status: COMPLETED | OUTPATIENT
Start: 2025-07-03 | End: 2025-07-03

## 2025-07-03 RX ADMIN — ATORVASTATIN CALCIUM 10 MG: 10 TABLET, FILM COATED ORAL at 16:59

## 2025-07-03 RX ADMIN — Medication 10 ML: at 09:34

## 2025-07-03 RX ADMIN — FINASTERIDE 5 MG: 5 TABLET, FILM COATED ORAL at 10:25

## 2025-07-03 RX ADMIN — Medication 10 ML: at 15:05

## 2025-07-03 RX ADMIN — RIVAROXABAN 20 MG: 20 TABLET, FILM COATED ORAL at 16:59

## 2025-07-03 RX ADMIN — METOPROLOL TARTRATE 50 MG: 50 TABLET, FILM COATED ORAL at 21:13

## 2025-07-03 RX ADMIN — FUROSEMIDE 40 MG: 10 INJECTION, SOLUTION INTRAMUSCULAR; INTRAVENOUS at 10:25

## 2025-07-03 NOTE — PROGRESS NOTES
Progress Note - Hospitalist   Name: Hernando Burkett 79 y.o. male I MRN: [id number]  Unit/Bed#: -01 I Date of Admission: 6/29/2025   Date of Service: 7/3/2025 I Hospital Day: 4    Assessment & Plan  Shortness of breath  Patient with PMH of hypertension, A-fib, hyperlipidemia, metastatic leiomyosarcoma from the left lower extremity with metastasis to the liver and bone who presents with shortness of breath.   He has a history of metastatic pleomorphic leiomyosarcoma, initially treated with doxorubicin 6- 8/2023, palliative RT to the spine 10/2023, and on pazopanib from 06/2024-06/2025 when he was recently found to have progression in the liver. He started trabectedin with C1D1 06/24/2025.    Patient reported shortness of breath for the last few days and was getting worse after he got his new chemotherapy.  Labs creatinine 1.89, AST 3013, , troponin 79> 73, , platelet count 141.  Mildly elevated troponin likely due to nonischemic myocardial injury due to volume overload.  CT PE study showed No pulmonary embolism. Bilateral moderate pleural effusions and associated compressive atelectasis of both lower lobes. Pulmonary nodules as above. These are suspicious for metastatic disease given history. There is suggestion of numerous small ill-defined liver lesions suspicious for metastases.  Echo done showing EF of 55% with systolic function normal, LA and RA mildly dilated, mild to moderate regurg of AV, MV, TV.  Plan:  Reports symptoms improvement with Lasix.  Currently O2 saturation 92 to 93% on room air  Given persistent large pleural effusion recommended proceeding with horacentesis for management purposes and also diagnostic. Patient and his wife more comfortable now proceeding with thoracentesis after discussing also with cardiology team.  Consulted IR to assist with thoracentesis.  Per cardiology patient does not appear to be in acute heart failure and likely symptoms in the setting of pleural  effusion.  Has been receiving IV Lasix 40 mg daily.  Given patient is getting thoracentesis today, might consider to stop diuretics tomorrow.  Monitor urine output.  Acute hypoxic respiratory failure (HCC) (Resolved: 7/3/2025)  Patient was hypoxic in the ED. currently on room air  Patient required 2 L of oxygen in the setting of bilateral pleural effusion  Continue to monitor oxygen saturation  CAT scan report reviewed noted with bilateral moderate pleural effusion associated with compressive atelectasis in both lower lobes.  Responding well to diuretics   Tentative for IR thoracentesis with fluid drainage for today.  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 He started trabectedin with C1D1 06/24/2025.  CT PE study showed No pulmonary embolism. Bilateral moderate pleural effusions and associated compressive atelectasis of both lower lobes. Pulmonary nodules as above. These are suspicious for metastatic disease given history. There is suggestion of numerous small ill-defined liver lesions suspicious for metastases.  Patient follows up with heme-onc outpatient at [facility].  GI consult.  Permanent atrial fibrillation  Patient with history of A-fib on Xarelto  Patient on metoprolol succinate 50 in the morning and 100 at night  Continue current regimen  Bilateral pleural effusion  See management as above  Transaminitis  Lab Results   Component Value Date     (H) 07/03/2025     (H) 07/02/2025     (H) 07/01/2025     (H) 07/03/2025     (H) 07/02/2025     (H) 07/01/2025      Patient presented with elevated liver enzymes  POA  and , currently downtrending  Per GI team was in touch with Dr. Barakat [facility] oncology suspected  care for therapy induced daily.  Patient might require dose adjustment in association with steroids chemo management.  Monitor daily CMP  Follow-up with GI recommendation.  Continue to monitor  PATRICIA (acute kidney injury) (HCC)  Lab Results   Component Value Date    CREATININE 1.39 (H) 07/03/2025    CREATININE 1.50 (H) 07/02/2025    CREATININE 1.59 (H) 07/01/2025      Patient creatinine presentation 1.89  Baseline 0.9  Currently improving with IV Lasix.  Can be side effect of new chemotherapy.  Continue monitoring kidney function being on IV Lasix.  Hypertension  Continue amlodipine 2.5 and metoprolol succinate  BPH (benign prostatic hyperplasia)  Continue finasteride    VTE Pharmacologic Prophylaxis:   Moderate Risk (Score 3-4) - Pharmacological DVT Prophylaxis Ordered: rivaroxaban (Xarelto).    Mobility:   Basic Mobility Inpatient Raw Score: 20  JH-HLM Goal: 6: Walk 10 steps or more  JH-HLM Achieved: 8: Walk 250 feet ot more  JH-HLM Goal achieved. Continue to encourage appropriate mobility.    Patient Centered Rounds: I performed bedside rounds with nursing staff today.   Discussions with Specialists or Other Care Team Provider: Discussed with cardiology and GI team.    Education and Discussions with Family / Patient: Updated  (wife) at bedside.    Current Length of Stay: 4 day(s)  Current Patient Status: Inpatient   Certification Statement: The patient will continue to require additional inpatient hospital stay due to pending thoracentesis  Discharge Plan: Anticipate discharge in 24-48 hrs to home.    Code Status: Level 1 - Full Code    Subjective   Patient admits that his symptoms significantly improved.  Patient on room air.  Tolerated well right-sided thoracentesis.  Pending left-sided thoracentesis.    Objective :  Temp:  [97.8 °F (36.6 °C)] 97.8 °F (36.6 °C)  HR:  [79-96] 88  BP: (103-121)/(65-76) 116/75  Resp:  [15-20] 16  SpO2:  [91 %-98 %] 98 %  O2 Device: None (Room air)    Body mass index is  27.76 kg/m².     Input and Output Summary (last 24 hours):     Intake/Output Summary (Last 24 hours) at 7/3/2025 1508  Last data filed at 7/3/2025 1455  Gross per 24 hour   Intake 600 ml   Output 1175 ml   Net -575 ml       Physical Exam  Vitals and nursing note reviewed.   Constitutional:       General: He is not in acute distress.     Appearance: He is well-developed. He is not ill-appearing.   HENT:      Head: Normocephalic and atraumatic.     Eyes:      Conjunctiva/sclera: Conjunctivae normal.       Cardiovascular:      Rate and Rhythm: Normal rate and regular rhythm.      Heart sounds: No murmur heard.  Pulmonary:      Effort: Pulmonary effort is normal. No respiratory distress.      Comments: Decreased breathing sounds at the bases  Abdominal:      Palpations: Abdomen is soft.      Tenderness: There is no abdominal tenderness.     Musculoskeletal:         General: No swelling.      Cervical back: Neck supple.      Right lower leg: No edema.      Left lower leg: No edema.     Skin:     General: Skin is warm and dry.      Capillary Refill: Capillary refill takes less than 2 seconds.     Neurological:      Mental Status: He is alert.     Psychiatric:         Mood and Affect: Mood normal.           Lines/Drains:  Lines/Drains/Airways       Active Status       Name Placement date Placement time Site Days    Port A Cath 04/29/23 Right Subclavian 04/29/23  1721  Subclavian  795                    Central Line:  Goal for removal: Port for chemotherapy.  Continue on discharge               Lab Results: I have reviewed the following results:   Results from last 7 days   Lab Units 07/03/25  0605   WBC Thousand/uL 4.69   HEMOGLOBIN g/dL 12.7   HEMATOCRIT % 38.8   PLATELETS Thousands/uL 135*   SEGS PCT % 74   LYMPHO PCT % 15   MONO PCT % 5   EOS PCT % 4     Results from last 7 days   Lab Units 07/03/25  0605   SODIUM mmol/L 136   POTASSIUM mmol/L 4.1   CHLORIDE mmol/L 106   CO2 mmol/L 24   BUN mg/dL 33*   CREATININE mg/dL  1.39*   ANION GAP mmol/L 6   CALCIUM mg/dL 7.9*   ALBUMIN g/dL 3.2*   TOTAL BILIRUBIN mg/dL 0.96   ALK PHOS U/L 225*   ALT U/L 260*   AST U/L 177*   GLUCOSE RANDOM mg/dL 90     Results from last 7 days   Lab Units 07/03/25  0605   INR  1.57*             Results from last 7 days   Lab Units 06/29/25  1151   LACTIC ACID mmol/L 1.3       Recent Cultures (last 7 days):         Imaging Results Review: I reviewed radiology reports from this admission including: procedure reports.  Other Study Results Review: No additional pertinent studies reviewed.    Last 24 Hours Medication List:     Current Facility-Administered Medications:     atorvastatin (LIPITOR) tablet 10 mg, Daily With Dinner    finasteride (PROSCAR) tablet 5 mg, Daily    furosemide (LASIX) injection 40 mg, Daily    lidocaine 1% buffered, PRN    metoprolol tartrate (LOPRESSOR) tablet 50 mg, Q12H TATE    rivaroxaban (XARELTO) tablet 20 mg, Daily With Dinner    Administrative Statements   Today, Patient Was Seen By: Rachael Hunt MD      **Please Note: This note may have been constructed using a voice recognition system.**

## 2025-07-03 NOTE — ASSESSMENT & PLAN NOTE
Patient was hypoxic in the ED. currently on room air  Patient required 2 L of oxygen in the setting of bilateral pleural effusion  Continue to monitor oxygen saturation  CAT scan report reviewed noted with bilateral moderate pleural effusion associated with compressive atelectasis in both lower lobes.  Responding well to diuretics   Tentative for IR thoracentesis with fluid drainage for today.

## 2025-07-03 NOTE — ASSESSMENT & PLAN NOTE
Lab Results   Component Value Date     (H) 07/03/2025     (H) 07/02/2025     (H) 07/01/2025     (H) 07/03/2025     (H) 07/02/2025     (H) 07/01/2025      Patient presented with elevated liver enzymes  POA  and , currently downtrending  Per GI team was in touch with Dr. Barakat [facility] oncology suspected care for therapy induced daily.  Patient might require dose adjustment in association with steroids chemo management.  Monitor daily CMP  Follow-up with GI recommendation.  Continue to monitor

## 2025-07-03 NOTE — ASSESSMENT & PLAN NOTE
Lab Results   Component Value Date    CREATININE 1.39 (H) 07/03/2025    CREATININE 1.50 (H) 07/02/2025    CREATININE 1.59 (H) 07/01/2025      Patient creatinine presentation 1.89  Baseline 0.9  Currently improving with IV Lasix.  Can be side effect of new chemotherapy.  Continue monitoring kidney function being on IV Lasix.

## 2025-07-03 NOTE — PLAN OF CARE
Problem: PAIN - ADULT  Goal: Verbalizes/displays adequate comfort level or baseline comfort level  Description: Interventions:  - Encourage patient to monitor pain and request assistance  - Assess pain using appropriate pain scale  - Administer analgesics as ordered based on type and severity of pain and evaluate response  - Implement non-pharmacological measures as appropriate and evaluate response  - Consider cultural and social influences on pain and pain management  - Notify physician/advanced practitioner if interventions unsuccessful or patient reports new pain  - Educate patient/family on pain management process including their role and importance of  reporting pain   - Provide non-pharmacologic/complimentary pain relief interventions  Outcome: Progressing     Problem: INFECTION - ADULT  Goal: Absence or prevention of progression during hospitalization  Description: INTERVENTIONS:  - Assess and monitor for signs and symptoms of infection  - Monitor lab/diagnostic results  - Monitor all insertion sites, i.e. indwelling lines, tubes, and drains  - Monitor endotracheal if appropriate and nasal secretions for changes in amount and color  - Hinkley appropriate cooling/warming therapies per order  - Administer medications as ordered  - Instruct and encourage patient and family to use good hand hygiene technique  - Identify and instruct in appropriate isolation precautions for identified infection/condition  Outcome: Progressing  Goal: Absence of fever/infection during neutropenic period  Description: INTERVENTIONS:  - Monitor WBC  - Perform strict hand hygiene  - Limit to healthy visitors only  - No plants, dried, fresh or silk flowers with lopez in patient room  Outcome: Progressing

## 2025-07-03 NOTE — BRIEF OP NOTE (RAD/CATH)
IR THORACENTESIS Procedure Note    PATIENT NAME: Hernando Burkett  : 1945  MRN: [id number]    Pre-op Diagnosis:   1. CHF (congestive heart failure) (HCC)    2. PATRICIA (acute kidney injury) (HCC)    3. Pleural effusion    4. Transaminitis    5. Elevated troponin    6. Hypoxia    7. 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 CHF (congestive heart failure) (HCC)    2. PATRICIA (acute kidney injury) (HCC)    3. Pleural effusion    4. Transaminitis    5. Elevated troponin    6. Hypoxia    7. 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    Specimens: None    Complications: None immediate    Anesthesia: Local    DARYL Kraft     Date: 7/3/2025  Time: 3:19 PM

## 2025-07-03 NOTE — BRIEF OP NOTE (RAD/CATH)
IR THORACENTESIS Procedure Note    PATIENT NAME: Hernando Burkett  : 1945  MRN: [id number]    Pre-op Diagnosis:   1. CHF (congestive heart failure) (HCC)    2. PATRICIA (acute kidney injury) (HCC)    3. Pleural effusion    4. Transaminitis    5. Elevated troponin    6. Hypoxia    7. 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 CHF (congestive heart failure) (HCC)    2. PATRICIA (acute kidney injury) (HCC)    3. Pleural effusion    4. Transaminitis    5. Elevated troponin    6. Hypoxia    7. 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    Pt with coughing after procedure. Will return this afternoon for LEFT sided procedure.     Specimens: Specimens obtained/sent as prdered     Complications:  None immediate     Anesthesia: local    DARYL Kraft     Date: 7/3/2025  Time: 9:49 AM

## 2025-07-03 NOTE — ASSESSMENT & PLAN NOTE
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   He has a history of metastatic pleomorphic leiomyosarcoma, initially treated with doxorubicin 6- 8/2023, palliative RT to the spine 10/2023, and on pazopanib from 06/2024-06/2025 when he was recently found to have progression in the liver. He started trabectedin with C1D1 06/24/2025.    Patient reported shortness of breath for the last few days and was getting worse after he got his new chemotherapy.  Labs creatinine 1.89, AST 3013, , troponin 79> 73, , platelet count 141.  Mildly elevated troponin likely due to nonischemic myocardial injury due to volume overload.  CT PE study showed No pulmonary embolism. Bilateral moderate pleural effusions and associated compressive atelectasis of both lower lobes. Pulmonary nodules as above. These are suspicious for metastatic disease given history. There is suggestion of numerous small ill-defined liver lesions suspicious for metastases.  Echo done showing EF of 55% with systolic function normal, LA and RA mildly dilated, mild to moderate regurg of AV, MV, TV.  Plan:  Reports symptoms improvement with Lasix.  Currently O2 saturation 92 to 93% on room air  Given persistent large pleural effusion recommended proceeding with horacentesis for management purposes and also diagnostic. Patient and his wife more comfortable now proceeding with thoracentesis after discussing also with cardiology team.  Consulted IR to assist with thoracentesis.  Per cardiology patient does not appear to be in acute heart failure and likely symptoms in the setting of pleural effusion.  Has been receiving IV Lasix 40 mg daily.  Given patient is getting thoracentesis today, might consider to stop diuretics tomorrow.  Monitor urine output.

## 2025-07-03 NOTE — QUICK NOTE
Shortness of breath likely secondary to BL pleural effusions with possible component of adverse effect from trabectedin.  Maintained on IV Lasix per primary service.  Scheduled for IR thoracentesis today.  Cardiology will sign off at this time, please reach out as needed.

## 2025-07-03 NOTE — ASSESSMENT & PLAN NOTE
He has a history of metastatic pleomorphic leiomyosarcoma, initially treated with doxorubicin 6- 8/2023, palliative RT to the spine 10/2023, and on pazopanib from 06/2024-06/2025 when he was recently found to have progression in the liver. He started trabectedin with C1D1 06/24/2025.  CT PE study showed No pulmonary embolism. Bilateral moderate pleural effusions and associated compressive atelectasis of both lower lobes. Pulmonary nodules as above. These are suspicious for metastatic disease given history. There is suggestion of numerous small ill-defined liver lesions suspicious for metastases.  Patient follows up with heme-onc outpatient at [facility].  GI consult.

## 2025-07-04 VITALS
RESPIRATION RATE: 16 BRPM | HEIGHT: 69 IN | DIASTOLIC BLOOD PRESSURE: 66 MMHG | SYSTOLIC BLOOD PRESSURE: 112 MMHG | HEART RATE: 73 BPM | BODY MASS INDEX: 27.85 KG/M2 | TEMPERATURE: 98.2 F | OXYGEN SATURATION: 97 % | WEIGHT: 188 LBS

## 2025-07-04 PROBLEM — J90 PLEURAL EFFUSION: Status: ACTIVE | Noted: 2025-06-29

## 2025-07-04 LAB
ANION GAP SERPL CALCULATED.3IONS-SCNC: 6 MMOL/L (ref 4–13)
BASOPHILS # BLD AUTO: 0.02 THOUSANDS/ÂΜL (ref 0–0.1)
BASOPHILS NFR BLD AUTO: 1 % (ref 0–1)
BUN SERPL-MCNC: 32 MG/DL (ref 5–25)
CALCIUM SERPL-MCNC: 8.1 MG/DL (ref 8.4–10.2)
CHLORIDE SERPL-SCNC: 107 MMOL/L (ref 96–108)
CO2 SERPL-SCNC: 25 MMOL/L (ref 21–32)
CREAT SERPL-MCNC: 1.13 MG/DL (ref 0.6–1.3)
EOSINOPHIL # BLD AUTO: 0.12 THOUSAND/ÂΜL (ref 0–0.61)
EOSINOPHIL NFR BLD AUTO: 3 % (ref 0–6)
ERYTHROCYTE [DISTWIDTH] IN BLOOD BY AUTOMATED COUNT: 15.1 % (ref 11.6–15.1)
GFR SERPL CREATININE-BSD FRML MDRD: 61 ML/MIN/1.73SQ M
GLUCOSE SERPL-MCNC: 91 MG/DL (ref 65–140)
HCT VFR BLD AUTO: 37.6 % (ref 36.5–49.3)
HGB BLD-MCNC: 12.3 G/DL (ref 12–17)
IMM GRANULOCYTES # BLD AUTO: 0.05 THOUSAND/UL (ref 0–0.2)
IMM GRANULOCYTES NFR BLD AUTO: 1 % (ref 0–2)
LYMPHOCYTES # BLD AUTO: 0.65 THOUSANDS/ÂΜL (ref 0.6–4.47)
LYMPHOCYTES NFR BLD AUTO: 18 % (ref 14–44)
MAGNESIUM SERPL-MCNC: 2 MG/DL (ref 1.9–2.7)
MCH RBC QN AUTO: 32.8 PG (ref 26.8–34.3)
MCHC RBC AUTO-ENTMCNC: 32.7 G/DL (ref 31.4–37.4)
MCV RBC AUTO: 100 FL (ref 82–98)
MONOCYTES # BLD AUTO: 0.25 THOUSAND/ÂΜL (ref 0.17–1.22)
MONOCYTES NFR BLD AUTO: 7 % (ref 4–12)
NEUTROPHILS # BLD AUTO: 2.59 THOUSANDS/ÂΜL (ref 1.85–7.62)
NEUTS SEG NFR BLD AUTO: 70 % (ref 43–75)
NRBC BLD AUTO-RTO: 4 /100 WBCS
PLATELET # BLD AUTO: 151 THOUSANDS/UL (ref 149–390)
PMV BLD AUTO: 10.8 FL (ref 8.9–12.7)
POTASSIUM SERPL-SCNC: 4.4 MMOL/L (ref 3.5–5.3)
RBC # BLD AUTO: 3.75 MILLION/UL (ref 3.88–5.62)
SODIUM SERPL-SCNC: 138 MMOL/L (ref 135–147)
WBC # BLD AUTO: 3.68 THOUSAND/UL (ref 4.31–10.16)

## 2025-07-04 PROCEDURE — 85025 COMPLETE CBC W/AUTO DIFF WBC: CPT

## 2025-07-04 PROCEDURE — 83735 ASSAY OF MAGNESIUM: CPT

## 2025-07-04 PROCEDURE — 80048 BASIC METABOLIC PNL TOTAL CA: CPT

## 2025-07-04 PROCEDURE — 99239 HOSP IP/OBS DSCHRG MGMT >30: CPT

## 2025-07-04 RX ORDER — FUROSEMIDE 20 MG/1
20 TABLET ORAL DAILY
Status: DISCONTINUED | OUTPATIENT
Start: 2025-07-04 | End: 2025-07-04 | Stop reason: HOSPADM

## 2025-07-04 RX ORDER — FUROSEMIDE 20 MG/1
20 TABLET ORAL DAILY
Qty: 30 TABLET | Refills: 0 | Status: SHIPPED | OUTPATIENT
Start: 2025-07-04 | End: 2025-07-08 | Stop reason: ALTCHOICE

## 2025-07-04 RX ADMIN — FINASTERIDE 5 MG: 5 TABLET, FILM COATED ORAL at 08:05

## 2025-07-04 RX ADMIN — FUROSEMIDE 20 MG: 20 TABLET ORAL at 12:39

## 2025-07-04 RX ADMIN — METOPROLOL TARTRATE 50 MG: 50 TABLET, FILM COATED ORAL at 08:05

## 2025-07-04 NOTE — PLAN OF CARE
Problem: PAIN - ADULT  Goal: Verbalizes/displays adequate comfort level or baseline comfort level  Description: Interventions:  - Encourage patient to monitor pain and request assistance  - Assess pain using appropriate pain scale  - Administer analgesics as ordered based on type and severity of pain and evaluate response  - Implement non-pharmacological measures as appropriate and evaluate response  - Consider cultural and social influences on pain and pain management  - Notify physician/advanced practitioner if interventions unsuccessful or patient reports new pain  - Educate patient/family on pain management process including their role and importance of  reporting pain   - Provide non-pharmacologic/complimentary pain relief interventions  Outcome: Progressing     Problem: INFECTION - ADULT  Goal: Absence or prevention of progression during hospitalization  Description: INTERVENTIONS:  - Assess and monitor for signs and symptoms of infection  - Monitor lab/diagnostic results  - Monitor all insertion sites, i.e. indwelling lines, tubes, and drains  - Monitor endotracheal if appropriate and nasal secretions for changes in amount and color  - Russell appropriate cooling/warming therapies per order  - Administer medications as ordered  - Instruct and encourage patient and family to use good hand hygiene technique  - Identify and instruct in appropriate isolation precautions for identified infection/condition  Outcome: Progressing  Goal: Absence of fever/infection during neutropenic period  Description: INTERVENTIONS:  - Monitor WBC  - Perform strict hand hygiene  - Limit to healthy visitors only  - No plants, dried, fresh or silk flowers with lopez in patient room  Outcome: Progressing     Problem: SAFETY ADULT  Goal: Patient will remain free of falls  Description: INTERVENTIONS:  - Educate patient/family on patient safety including physical limitations  - Instruct patient to call for assistance with activity   -  Consider consulting OT/PT to assist with strengthening/mobility based on AM PAC & JH-HLM score  - Consult OT/PT to assist with strengthening/mobility   - Keep Call bell within reach  - Keep bed low and locked with side rails adjusted as appropriate  - Keep care items and personal belongings within reach  - Initiate and maintain comfort rounds  - Make Fall Risk Sign visible to staff  - Apply yellow socks and bracelet for high fall risk patients  - Consider moving patient to room near nurses station  Outcome: Progressing  Goal: Maintain or return to baseline ADL function  Description: INTERVENTIONS:  -  Assess patient's ability to carry out ADLs; assess patient's baseline for ADL function and identify physical deficits which impact ability to perform ADLs (bathing, care of mouth/teeth, toileting, grooming, dressing, etc.)  - Assess/evaluate cause of self-care deficits   - Assess range of motion  - Assess patient's mobility; develop plan if impaired  - Assess patient's need for assistive devices and provide as appropriate  - Encourage maximum independence but intervene and supervise when necessary  - Involve family in performance of ADLs  - Assess for home care needs following discharge   - Consider OT consult to assist with ADL evaluation and planning for discharge  - Provide patient education as appropriate  - Monitor functional capacity and physical performance, use of AM PAC & JH-HLM   - Monitor gait, balance and fatigue with ambulation    Outcome: Progressing  Goal: Maintains/Returns to pre admission functional level  Description: INTERVENTIONS:  - Perform AM-PAC 6 Click Basic Mobility/ Daily Activity assessment daily.  - Set and communicate daily mobility goal to care team and patient/family/caregiver.   - Collaborate with rehabilitation services on mobility goals if consulted  - Perform Range of Motion 3 times a day.  - Stand patient 3 times a day  - Ambulate patient 3 times a day  - Out of bed to chair 3 times  a day   - Out of bed for meals 3 times a day  - Out of bed for toileting  - Record patient progress and toleration of activity level   Outcome: Progressing     Problem: DISCHARGE PLANNING  Goal: Discharge to home or other facility with appropriate resources  Description: INTERVENTIONS:  - Identify barriers to discharge w/patient and caregiver  - Arrange for needed discharge resources and transportation as appropriate  - Identify discharge learning needs (meds, wound care, etc.)  - Arrange for interpretive services to assist at discharge as needed  - Refer to Case Management Department for coordinating discharge planning if the patient needs post-hospital services based on physician/advanced practitioner order or complex needs related to functional status, cognitive ability, or social support system  Outcome: Progressing     Problem: Knowledge Deficit  Goal: Patient/family/caregiver demonstrates understanding of disease process, treatment plan, medications, and discharge instructions  Description: Complete learning assessment and assess knowledge base.  Interventions:  - Provide teaching at level of understanding  - Provide teaching via preferred learning methods  Outcome: Progressing     Problem: Decreased Cardiac Output  Goal: Cardiac output adequate for individual needs  Description: INTERVENTIONS: Monitor for signs and symptoms of decreased cardiac output   - Monitor for dyspnea with exertion and at rest  - Monitor for orthopnea  - Monitor for signs of tachycardia. Place patient on telemetry monitoring.  - Assess patient for jugular vein distention  - Assess patient for lower extremity edema and poor peripheral perfusion   - Auscultate lung sound for Fine bibasilar crackles   - Monitor for cardiac arrythmias   - Administer beta blockers, antiarrhythmic, and blood pressure medications as ordered    Outcome: Progressing     Problem: Impaired Gas Exchange  Goal: Optimize oxygenation and ensure adequate  ventilation  Description: INTERVENTIONS: Monitor for signs and symptoms of respiratory distress                - Elevate HOB or use high fowlers to promote lung expansion                - Administer oxygen as ordered to maintain adequate oxygenation                - Encourage use of IS to promote lung expansion and prevent PN                - Monitor ABGs to assess oxygenation status                - Monitor blood oxygen level to maintain adequate oxygenation                - Encourage cough and deep breathing exercises to promote lung expansion                - Monitor patient's mental status for increased confusion    Outcome: Progressing     Problem: Excess Fluid Volume  Goal: Patient is able to achieve and maintain homeostasis  Description: INTERVENTIONS: Monitor for sign and symptoms of fluid overload  - Evaluate LE edema every shift  - Elevate LE to prevent dependent edema  - Apply COLLIN stockings as ordered   - Monitor ankle circumference daily  - Assess for jugular vein distention  - Evaluate provider orders for the CHF diuretic algorithm. Administer diuretics as ordered  - Weigh the patient daily at 0600 and report a weight gain of five pounds or more   - Strict intake and output  - Monitor fluid intake and adhere to fluid restrictions  - Assess lung sounds every shift and as needed  - Monitor vital signs and lab values (CBC, chem, BUN, BNP)  - Measure and document urine output    Outcome: Progressing     Problem: Activity Intolerance  Goal: Patient is able to perform activities within their limitations  Description: INTERVENTIONS:                       -   Alternate periods of activity with periods of rest                 -   Patients is able to maintain normal vitals heart rhythm during activity                 -   Gradually increase activity and exercise as patient can tolerate                 -   Monitor blood pressure and heart before and after exercise                  -   Monitor blood oxygen  saturation during activity and apply oxygen as needed    Outcome: Progressing     Problem: Knowledge Deficit  Goal: Patient is able to verbalize understanding of Heart Failure after education  Description: INTERVENTIONS:  - Educate the patient and family on signs and symptoms of HF  - Provide the patient with HF education and HF zone tool  - Educate on the importance of daily weight in the AM and reporting a weight gain               of 3 or more pounds to their primary care physician  - Monitor for SOB  - Maintain and sodium and fluid restriction  - Educate the patient on the importance of medications such as: diuretics, betablockers,               antiarrhythmics and their purpose, dose, route, side effects and labs               if they are needed    Outcome: Progressing

## 2025-07-04 NOTE — DISCHARGE SUMMARY
Discharge Summary - Hospitalist   Name: Hernando Burkett 79 y.o. 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   He has a history of metastatic pleomorphic leiomyosarcoma, initially treated with doxorubicin 6- 8/2023, palliative RT to the spine 10/2023, and on pazopanib from 06/2024-06/2025 when he was recently found to have progression in the liver. He started trabectedin with C1D1 06/24/2025.    Patient reported shortness of breath for the last few days and was getting worse after he got his new chemotherapy.  Labs creatinine 1.89, AST 3013, , troponin 79> 73, , platelet count 141.  Mildly elevated troponin likely due to nonischemic myocardial injury due to volume overload.  CT PE study showed No pulmonary embolism. Bilateral moderate pleural effusions and associated compressive atelectasis of both lower lobes. Pulmonary nodules as above. These are suspicious for metastatic disease given history. There is suggestion of numerous small ill-defined liver lesions suspicious for metastases.  Echo done showing EF of 55% with systolic function normal, LA and RA mildly dilated, mild to moderate regurg of AV, MV, TV.  Plan:  Reports symptoms improvement with Lasix and after thoracentesis.  S/p thoracentesis with right-sided about 1 L and left side 350 mL of yellow fluid removal.  Lights criteria negative.  Suspicion  Currently O2 saturation 92 to 93% on room air  Per cardiology patient does not appear to be in acute heart failure and likely symptoms in the setting of pleural effusion.  Transitioned to oral Lasix 20 mg daily to prevent recommendation of the fluid.  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 He started trabectedin with C1D1 06/24/2025.  CT PE study showed No pulmonary embolism. Bilateral moderate pleural effusions and associated compressive atelectasis of both lower lobes. Pulmonary nodules as above. These are suspicious for metastatic disease given history. There is suggestion of numerous small ill-defined liver lesions suspicious for metastases.  Patient follows up with heme-onc outpatient at [facility].  GI consult.  Permanent atrial fibrillation  Patient with history of A-fib on Xarelto  Patient on metoprolol succinate 50 in the morning and 100 at night  Continue current regimen  Bilateral pleural effusion  See management as above  Transaminitis  Lab Results   Component Value Date     (H) 07/03/2025     (H) 07/02/2025     (H) 07/01/2025     (H) 07/03/2025     (H) 07/02/2025     (H) 07/01/2025      Patient presented with elevated liver enzymes  POA  and , liver numbers continue to downtrend  Per GI team was in touch with Dr. Barakat [facility] oncology suspected care for therapy induced daily.  Patient might require dose adjustment in association with steroids chemo management.  Monitor daily CMP  Follow-up with oncology team and repeat a CMP in 1 week.  PATRICIA (acute kidney injury) (HCC)  Lab Results   Component Value Date    CREATININE 1.13 07/04/2025    CREATININE 1.39 (H) 07/03/2025    CREATININE 1.50 (H) 07/02/2025      Patient creatinine presentation 1.89  Baseline 0.9  Kidney numbers continue to improve.  Can be side effect of new chemotherapy.  Will continue Lasix 20 mg on discharge.  Repeat a BMP in 1 week  Hypertension  Continue amlodipine 2.5 and  metoprolol succinate  BPH (benign prostatic hyperplasia)  Continue finasteride     Medical Problems       Resolved Problems  Date Reviewed: 6/15/2022          Resolved    Acute hypoxic respiratory failure (HCC) 7/3/2025     Resolved by  Rachael Hunt MD        Discharging Physician / Practitioner: Rachael Hunt MD  PCP: Misael Rai DO  Admission Date:   Admission Orders (From admission, onward)       Ordered        06/29/25 1313  INPATIENT ADMISSION  Once            06/29/25 1313  Inpatient Admission  Once                          Discharge Date: 07/04/25    Next Steps for Physician/AP Assuming Care:  Follow-up with oncology team as well as primary team    Test Results Pending at Discharge (will require follow up):  Cytology    Medication Changes for Discharge & Rationale:   Start Lasix 20 mg daily and stop amlodipine 2.5  Will decrease metoprolol to 50 mg every 12 hours  See after visit summary for reconciled discharge medications provided to patient and/or family.     Consultations During Hospital Stay:  Cardiology and gastroenterology    Procedures Performed:   Thoracentesis of the left and right side    Significant Findings / Test Results:   Results for orders placed during the hospital encounter of 06/29/25    Echo complete w/ contrast if indicated    Interpretation Summary    Left Ventricle: Left ventricular cavity size is normal. Wall thickness is increased. There is concentric remodeling. The left ventricular ejection fraction is 55%. Systolic function is normal. Wall motion is normal.    Right Ventricle: Right ventricular cavity size is normal. Systolic function is normal.    Left Atrium: The atrium is dilated.    Right Atrium: The atrium is mildly dilated.    Aortic Valve: The aortic valve is trileaflet. The leaflets are mildly thickened. The leaflets exhibit normal mobility. There is mild to moderate regurgitation.    Mitral Valve: There is mild thickening. The leaflets exhibit normal  mobility. There is mild to moderate regurgitation. There is no evidence of stenosis.    Tricuspid Valve: There is moderate regurgitation. The estimated right ventricular systolic pressure is 37.00 mmHg.    Aorta: The ascending aorta is mildly dilated. The ascending aorta is 4 cm.    Pericardium: There is a large left pleural effusion. There is a large right pleural effusion.  There appears to be evidence of atelectasis versus consolidation noted in the left pleural space, clinical correlation is recommended.    On subcostal imaging, liver lesions are noted-recommend further imaging given history of malignancy    Strain was performed to quantify interventricular dyssynchrony and evaluate components of myocardial function due to Chemotherapy,  . Results from the utilization of Strain Analysis are listed in the report below.    -14.2% which is reduced        IR IN-Patient Thoracentesis   Final Result by Christian Solis MD (07/03 1651)   Impression:      Ultrasound-guided left thoracentesis.      Signed, performed, and dictated by DARYL Bryan under the supervision of Dr. Solis.      Workstation performed: [id number]         IR IN-Patient Thoracentesis   Final Result by Christian Solis MD (07/03 1044)   Impression:      Ultrasound-guided right thoracentesis.      Signed, performed, and dictated by DARYL Bryan under the supervision of Dr. Solsi.      Workstation performed: [id number]         XR chest portable   Final Result by Butch Pizarro MD (07/03 0857)      Stable bibasilar consolidations and pleural effusions            Workstation performed: [id number]         US right upper quadrant   Final Result by Luis F Almanza MD (07/01 0816)      Multiple hepatic lesions attributed to metastatic disease.      No cholelithiasis or evidence of cholecystitis.      No biliary dilation.      Workstation performed: [id number]         CTA chest pe study   Final Result by Riki Garcia MD (06/29 1323)      1. No  pulmonary embolism.      2.  Bilateral moderate pleural effusions and associated compressive atelectasis of both lower lobes.      3. Pulmonary nodules as above. These are suspicious for metastatic disease given history.      4. There is suggestion of numerous small ill-defined liver lesions suspicious for metastases.         Computerized Assisted Algorithm (CAA) may have aided analysis of applicable images.      Resident: Simon Westfall I, the attending radiologist, have reviewed the images and agree with the final report above.      Workstation performed: [id number]         XR chest 1 view portable   Final Result by Charlene Gaston MD (06/30 1048)      Mild pulmonary venous congestion with large pleural effusions and bibasilar atelectasis. See subsequent chest CT.            Workstation performed: [id number]           Results Reviewed       Procedure Component Value Units Date/Time    Comprehensive metabolic panel [[id number]]  (Abnormal) Collected: 06/30/25 0534    Lab Status: Final result Specimen: Blood from Central Venous Line Updated: 06/30/25 0616     Sodium 135 mmol/L      Potassium 5.0 mmol/L      Chloride 104 mmol/L      CO2 25 mmol/L      ANION GAP 6 mmol/L      BUN 43 mg/dL      Creatinine 1.72 mg/dL      Glucose 102 mg/dL      Calcium 7.6 mg/dL      Corrected Calcium 8.1 mg/dL       U/L       U/L      Alkaline Phosphatase 183 U/L      Total Protein 5.4 g/dL      Albumin 3.4 g/dL      Total Bilirubin 1.48 mg/dL      eGFR 37 ml/min/1.73sq m     Narrative:      National Kidney Disease Foundation guidelines for Chronic Kidney Disease (CKD):     Stage 1 with normal or high GFR (GFR > 90 mL/min/1.73 square meters)    Stage 2 Mild CKD (GFR = 60-89 mL/min/1.73 square meters)    Stage 3A Moderate CKD (GFR = 45-59 mL/min/1.73 square meters)    Stage 3B Moderate CKD (GFR = 30-44 mL/min/1.73 square meters)    Stage 4 Severe CKD (GFR = 15-29 mL/min/1.73 square meters)    Stage 5 End  Stage CKD (GFR <15 mL/min/1.73 square meters)  Note: GFR calculation is accurate only with a steady state creatinine    Magnesium [[id number]]  (Normal) Collected: 06/30/25 0534    Lab Status: Final result Specimen: Blood from Central Venous Line Updated: 06/30/25 0616     Magnesium 2.3 mg/dL     Phosphorus [[id number]]  (Normal) Collected: 06/30/25 0534    Lab Status: Final result Specimen: Blood from Central Venous Line Updated: 06/30/25 0616     Phosphorus 3.6 mg/dL     CBC and differential [[id number]]  (Abnormal) Collected: 06/30/25 0534    Lab Status: Final result Specimen: Blood from Central Venous Line Updated: 06/30/25 0601     WBC 6.60 Thousand/uL      RBC 3.63 Million/uL      Hemoglobin 12.4 g/dL      Hematocrit 36.8 %       fL      MCH 34.2 pg      MCHC 33.7 g/dL      RDW 15.7 %      MPV 10.7 fL      Platelets 123 Thousands/uL      nRBC 0 /100 WBCs      Segmented % 78 %      Immature Grans % 1 %      Lymphocytes % 12 %      Monocytes % 7 %      Eosinophils Relative 2 %      Basophils Relative 0 %      Absolute Neutrophils 5.12 Thousands/µL      Absolute Immature Grans 0.07 Thousand/uL      Absolute Lymphocytes 0.76 Thousands/µL      Absolute Monocytes 0.47 Thousand/µL      Eosinophils Absolute 0.16 Thousand/µL      Basophils Absolute 0.02 Thousands/µL     HS Troponin I 4hr [[id number]]  (Abnormal) Collected: 06/29/25 1717    Lab Status: Final result Specimen: Blood from Central Venous Line Updated: 06/29/25 1752     hs TnI 4hr 90 ng/L      Delta 4hr hsTnI 11 ng/L     TSH, 3rd generation [[id number]]  (Normal) Collected: 06/29/25 1441    Lab Status: Final result Specimen: Blood from Arm, Right Updated: 06/29/25 1631     TSH 3RD GENERATION 2.101 uIU/mL     HS Troponin I 2hr [[id number]]  (Abnormal) Collected: 06/29/25 1441    Lab Status: Final result Specimen: Blood from Arm, Right Updated: 06/29/25 1535     hs TnI 2hr 73 ng/L      Delta 2hr hsTnI -6 ng/L     APTT [[id number]]  (Normal) Collected: 06/29/25  1151    Lab Status: Final result Specimen: Blood from Arm, Right Updated: 06/29/25 1230     PTT 34 seconds     Protime-INR [[id number]]  (Abnormal) Collected: 06/29/25 1151    Lab Status: Final result Specimen: Blood from Arm, Right Updated: 06/29/25 1230     Protime 16.8 seconds      INR 1.29    Narrative:      INR Therapeutic Range    Indication                                             INR Range      Atrial Fibrillation                                               2.0-3.0  Hypercoagulable State                                    2.0.2.3  Left Ventricular Asist Device                            2.0-3.0  Mechanical Heart Valve                                  -    Aortic(with afib, MI, embolism, HF, LA enlargement,    and/or coagulopathy)                                     2.0-3.0 (2.5-3.5)     Mitral                                                             2.5-3.5  Prosthetic/Bioprosthetic Heart Valve               2.0-3.0  Venous thromboembolism (VTE: VT, PE        2.0-3.0    HS Troponin 0hr (reflex protocol) [[id number]]  (Abnormal) Collected: 06/29/25 1151    Lab Status: Final result Specimen: Blood from Arm, Right Updated: 06/29/25 1222     hs TnI 0hr 79 ng/L     B-Type Natriuretic Peptide(BNP) [[id number]]  (Abnormal) Collected: 06/29/25 1151    Lab Status: Final result Specimen: Blood from Arm, Right Updated: 06/29/25 1222      pg/mL     FLU/COVID Rapid Antigen (30 min. TAT) - Preferred screening test in ED [[id number]]  (Normal) Collected: 06/29/25 1140    Lab Status: Final result Specimen: Nares from Nose Updated: 06/29/25 1222     SARS COV Rapid Antigen Negative     Influenza A Rapid Antigen Negative     Influenza B Rapid Antigen Negative    Narrative:      This test has been performed using the [employer] Asuncion 2 FLU+SARS Antigen test under the Emergency Use Authorization (EUA). This test has been validated by the  and verified by the performing laboratory. The Asuncion uses lateral flow  immunofluorescent sandwich assay to detect SARS-COV, Influenza A and Influenza B Antigen.     The [employer] Asuncion 2 SARS Antigen test does not differentiate between SARS-CoV and SARS-CoV-2.     Negative results are presumptive and may be confirmed with a molecular assay, if necessary, for patient management. Negative results do not rule out SARS-CoV-2 or influenza infection and should not be used as the sole basis for treatment or patient management decisions. A negative test result may occur if the level of antigen in a sample is below the limit of detection of this test.     Positive results are indicative of the presence of viral antigens, but do not rule out bacterial infection or co-infection with other viruses.     All test results should be used as an adjunct to clinical observations and other information available to the provider.    FOR PEDIATRIC PATIENTS - copy/paste COVID Guidelines URL to browser: [email or web address]    Lactic acid, plasma (w/reflex if result > 2.0) [[id number]]  (Normal) Collected: 06/29/25 1151    Lab Status: Final result Specimen: Blood from Arm, Right Updated: 06/29/25 1216     LACTIC ACID 1.3 mmol/L     Narrative:      Result may be elevated if tourniquet was used during collection.    Basic metabolic panel [[id number]]  (Abnormal) Collected: 06/29/25 1151    Lab Status: Final result Specimen: Blood from Arm, Right Updated: 06/29/25 1216     Sodium 135 mmol/L      Potassium 5.1 mmol/L      Chloride 102 mmol/L      CO2 25 mmol/L      ANION GAP 8 mmol/L      BUN 49 mg/dL      Creatinine 1.89 mg/dL      Glucose 110 mg/dL      Calcium 8.2 mg/dL      eGFR 33 ml/min/1.73sq m     Narrative:      National Kidney Disease Foundation guidelines for Chronic Kidney Disease (CKD):     Stage 1 with normal or high GFR (GFR > 90 mL/min/1.73 square meters)    Stage 2 Mild CKD (GFR = 60-89 mL/min/1.73 square meters)    Stage 3A Moderate CKD (GFR =  45-59 mL/min/1.73 square meters)    Stage 3B Moderate CKD (GFR = 30-44 mL/min/1.73 square meters)    Stage 4 Severe CKD (GFR = 15-29 mL/min/1.73 square meters)    Stage 5 End Stage CKD (GFR <15 mL/min/1.73 square meters)  Note: GFR calculation is accurate only with a steady state creatinine    Hepatic function panel [[id number]]  (Abnormal) Collected: 06/29/25 1151    Lab Status: Final result Specimen: Blood from Arm, Right Updated: 06/29/25 1216     Total Bilirubin 1.75 mg/dL      Bilirubin, Direct 0.70 mg/dL      Alkaline Phosphatase 206 U/L       U/L       U/L      Total Protein 6.2 g/dL      Albumin 3.8 g/dL     CBC and differential [[id number]]  (Abnormal) Collected: 06/29/25 1151    Lab Status: Final result Specimen: Blood from Arm, Right Updated: 06/29/25 1209     WBC 7.33 Thousand/uL      RBC 3.89 Million/uL      Hemoglobin 13.1 g/dL      Hematocrit 39.5 %       fL      MCH 33.7 pg      MCHC 33.2 g/dL      RDW 15.9 %      MPV 10.8 fL      Platelets 141 Thousands/uL      nRBC 0 /100 WBCs      Segmented % 79 %      Immature Grans % 1 %      Lymphocytes % 11 %      Monocytes % 7 %      Eosinophils Relative 2 %      Basophils Relative 0 %      Absolute Neutrophils 5.83 Thousands/µL      Absolute Immature Grans 0.04 Thousand/uL      Absolute Lymphocytes 0.82 Thousands/µL      Absolute Monocytes 0.50 Thousand/µL      Eosinophils Absolute 0.12 Thousand/µL      Basophils Absolute 0.02 Thousands/µL              Incidental Findings:   Numerous small ill-defined liver lesions suspicion for metastasis      Hospital Course:   Hernando Burkett is a 79 y.o. male patient who originally presented to the hospital on 6/29/2025 due to shortness of breath for the past few days.  Patient was found to have bilateral pleural effusion.  Patient was started on IV Lasix with some improvement in symptoms.  Given persistent pleural effusion despite diuresis patient was agreeable to proceed with thoracentesis.  Was  "removed a total of 1.35 L yellow fluid.  Patient's oxygenation improved and he remained on room air.  Cytology still pending on discharge.  Patient also noted to have PATRICIA and elevated liver enzymes.  Numbers slowly improved with diuresis.  Patient was advised to continue diuresis on discharge and patient will follow-up with oncology team and primary team.  Cardiology team was also evaluating the patient during hospital stay given concern for CHF however it was felt that pleural effusions are related to recent chemo and not CHF exacerbation.  Echo remained within normal limits          Please see above list of diagnoses and related plan for additional information.     Discharge Day Visit / Exam:   Subjective: Patient admits being asymptomatic with no overnight events.  Patient remains on room air.  Vitals: Blood Pressure: 112/66 (07/04/25 1038)  Pulse: 73 (07/04/25 1038)  Temperature: 98.2 °F (36.8 °C) (07/04/25 0712)  Temp Source: Oral (07/03/25 2237)  Respirations: 16 (07/03/25 2237)  Height: 5' 9\" (175.3 cm) (06/30/25 1451)  Weight - Scale: 85.3 kg (188 lb) (06/30/25 1451)  SpO2: 97 % (07/04/25 1038)  Physical Exam  Vitals and nursing note reviewed.   Constitutional:       General: He is not in acute distress.     Appearance: He is well-developed.   HENT:      Head: Normocephalic and atraumatic.     Eyes:      Conjunctiva/sclera: Conjunctivae normal.       Cardiovascular:      Rate and Rhythm: Normal rate and regular rhythm.      Heart sounds: No murmur heard.  Pulmonary:      Effort: Pulmonary effort is normal. No respiratory distress.      Breath sounds: Normal breath sounds. No wheezing or rales.   Abdominal:      Palpations: Abdomen is soft.      Tenderness: There is no abdominal tenderness.     Musculoskeletal:         General: No swelling.      Cervical back: Neck supple.      Right lower leg: No edema.      Left lower leg: No edema.     Skin:     General: Skin is warm and dry.      Capillary Refill: " Capillary refill takes less than 2 seconds.     Neurological:      Mental Status: He is alert.     Psychiatric:         Mood and Affect: Mood normal.          Discussion with Family: Updated  (wife) at bedside.    Discharge instructions/Information to patient and family:   See after visit summary for information provided to patient and family.      Provisions for Follow-Up Care:  See after visit summary for information related to follow-up care and any pertinent home health orders.      Mobility at time of Discharge:   Basic Mobility Inpatient Raw Score: 20  JH-HLM Goal: 6: Walk 10 steps or more  JH-HLM Achieved: 7: Walk 25 feet or more  HLM Goal achieved. Continue to encourage appropriate mobility.     Disposition:   Home    Planned Readmission: no    Administrative Statements   Discharge Statement:  I have spent a total time of 45 minutes in caring for this patient on the day of the visit/encounter. >30 minutes of time was spent on: Diagnostic results, Prognosis, Risks and benefits of tx options, Instructions for management, Patient and family education, Importance of tx compliance, Risk factor reductions, Impressions, Counseling / Coordination of care, Documenting in the medical record, Reviewing / ordering tests, medicine, procedures  , and Communicating with other healthcare professionals .    **Please Note: This note may have been constructed using a voice recognition system**

## 2025-07-04 NOTE — ASSESSMENT & PLAN NOTE
Lab Results   Component Value Date    CREATININE 1.13 07/04/2025    CREATININE 1.39 (H) 07/03/2025    CREATININE 1.50 (H) 07/02/2025      Patient creatinine presentation 1.89  Baseline 0.9  Kidney numbers continue to improve.  Can be side effect of new chemotherapy.  Will continue Lasix 20 mg on discharge.  Repeat a BMP in 1 week

## 2025-07-04 NOTE — ASSESSMENT & PLAN NOTE
He has a history of metastatic pleomorphic leiomyosarcoma, initially treated with doxorubicin 6- 8/2023, palliative RT to the spine 10/2023, and on pazopanib from 06/2024-06/2025 when he was recently found to have progression in the liver. He started trabectedin with C1D1 06/24/2025.  CT PE study showed No pulmonary embolism. Bilateral moderate pleural effusions and associated compressive atelectasis of both lower lobes. Pulmonary nodules as above. These are suspicious for metastatic disease given history. There is suggestion of numerous small ill-defined liver lesions suspicious for metastases.  Patient follows up with heme-onc outpatient at [facility].  GI consult.

## 2025-07-04 NOTE — ASSESSMENT & PLAN NOTE
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   He has a history of metastatic pleomorphic leiomyosarcoma, initially treated with doxorubicin 6- 8/2023, palliative RT to the spine 10/2023, and on pazopanib from 06/2024-06/2025 when he was recently found to have progression in the liver. He started trabectedin with C1D1 06/24/2025.    Patient reported shortness of breath for the last few days and was getting worse after he got his new chemotherapy.  Labs creatinine 1.89, AST 3013, , troponin 79> 73, , platelet count 141.  Mildly elevated troponin likely due to nonischemic myocardial injury due to volume overload.  CT PE study showed No pulmonary embolism. Bilateral moderate pleural effusions and associated compressive atelectasis of both lower lobes. Pulmonary nodules as above. These are suspicious for metastatic disease given history. There is suggestion of numerous small ill-defined liver lesions suspicious for metastases.  Echo done showing EF of 55% with systolic function normal, LA and RA mildly dilated, mild to moderate regurg of AV, MV, TV.  Plan:  Reports symptoms improvement with Lasix and after thoracentesis.  S/p thoracentesis with right-sided about 1 L and left side 350 mL of yellow fluid removal.  Lights criteria negative.  Suspicion  Currently O2 saturation 92 to 93% on room air  Per cardiology patient does not appear to be in acute heart failure and likely symptoms in the setting of pleural effusion.  Transitioned to oral Lasix 20 mg daily to prevent recommendation of the fluid.  Follow-up on cytology result with oncology team

## 2025-07-04 NOTE — ASSESSMENT & PLAN NOTE
Lab Results   Component Value Date     (H) 07/03/2025     (H) 07/02/2025     (H) 07/01/2025     (H) 07/03/2025     (H) 07/02/2025     (H) 07/01/2025      Patient presented with elevated liver enzymes  POA  and , liver numbers continue to downtrend  Per GI team was in touch with Dr. Barakat [facility] oncology suspected care for therapy induced daily.  Patient might require dose adjustment in association with steroids chemo management.  Monitor daily CMP  Follow-up with oncology team and repeat a CMP in 1 week.

## 2025-07-06 LAB
BACTERIA SPEC BFLD CULT: NO GROWTH
GRAM STN SPEC: NORMAL

## 2025-07-08 ENCOUNTER — TELEPHONE (OUTPATIENT)
Dept: CARDIOLOGY CLINIC | Facility: CLINIC | Age: 80
End: 2025-07-08

## 2025-07-08 ENCOUNTER — OFFICE VISIT (OUTPATIENT)
Dept: CARDIOLOGY CLINIC | Facility: CLINIC | Age: 80
End: 2025-07-08
Payer: MEDICARE

## 2025-07-08 VITALS
HEART RATE: 90 BPM | DIASTOLIC BLOOD PRESSURE: 66 MMHG | SYSTOLIC BLOOD PRESSURE: 102 MMHG | OXYGEN SATURATION: 98 % | HEIGHT: 69 IN | RESPIRATION RATE: 16 BRPM | WEIGHT: 180 LBS | BODY MASS INDEX: 26.66 KG/M2

## 2025-07-08 DIAGNOSIS — I38 VALVULAR HEART DISEASE: ICD-10-CM

## 2025-07-08 DIAGNOSIS — I10 PRIMARY HYPERTENSION: ICD-10-CM

## 2025-07-08 DIAGNOSIS — I48.21 PERMANENT ATRIAL FIBRILLATION (HCC): ICD-10-CM

## 2025-07-08 DIAGNOSIS — J90 BILATERAL PLEURAL EFFUSION: ICD-10-CM

## 2025-07-08 DIAGNOSIS — C78.7 METASTATIC LEIOMYOSARCOMA TO LIVER (HCC): Primary | ICD-10-CM

## 2025-07-08 DIAGNOSIS — C49.9 METASTATIC LEIOMYOSARCOMA TO LIVER (HCC): Primary | ICD-10-CM

## 2025-07-08 PROCEDURE — 99215 OFFICE O/P EST HI 40 MIN: CPT | Performed by: INTERNAL MEDICINE

## 2025-07-08 PROCEDURE — 88305 TISSUE EXAM BY PATHOLOGIST: CPT | Performed by: PATHOLOGY

## 2025-07-08 PROCEDURE — 88112 CYTOPATH CELL ENHANCE TECH: CPT | Performed by: PATHOLOGY

## 2025-07-08 RX ORDER — METOPROLOL SUCCINATE 50 MG/1
50 TABLET, EXTENDED RELEASE ORAL 2 TIMES DAILY
Status: SHIPPED
Start: 2025-07-08

## 2025-07-08 NOTE — PROGRESS NOTES
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  when he was recently found to have progression in the liver. He started trabectedin with C1D1 06/24/2025.      Since patient is scheduled for cycle 2 on July 12, 2025, close monitoring for cancer treatment related cardiac dysfunction (CTRCD) which are but not limited to ADHF, cardiomyopathy/LV dysfunction, recurrence of pleural effusion, edematous state was advised along with sooner Cardio-Oncology office visit follow up. Patient and wife were instructed to seek immediate medical attention if these develop. They are planning on lodging near [facility] during and few days after cycle 2 of Trabectedin.     Transthoracic echocardiogram on June 30, 2025 showed normal LVEF but with reduced global longitudinal strain (GLS) at -14%.  Continue cardioprotective medication with metoprolol succinate.  Periodic monitoring for CTRCD with serial transthoracic echocardiogram to follow-up on ventricular function with GLS every 2-3 months.    I reached out to patient's Oncologist, Dr. Javier Maki of [facility] and is planning on discussing management including monitoring for CTRCD from Cardio-Oncology standpoint. According to his nursing staff, he will be available on Monday July 14, 2025.   Bilateral pleural effusion  Patient had recent hospital admission for symptomatic bilateral pleural effusion requiring thoracenteses.  He is now asymptomatic.  Monitor clinically for recurrence of pleural effusion during and after  cycle 2 of Trabectedin with repeat CXR and/or Chest CT if to report dyspnea.   Permanent atrial fibrillation  Patient has permanent atrial fibrillation with controlled ventricular rate and remains to be asymptomatic.  Continue rate control with metoprolol succinate and long-term oral anticoagulation with Xarelto 20 mg p.o. once daily.  Valvular heart disease  Transthoracic echocardiogram on 06/30/25 showed normal biventricular systolic function, LVEF 55% with mild to moderate aortic regurgitation, mild to moderate mitral regurgitation, moderate tricuspid regurgitation. Patient is asymptomatic from this condition. Monitor for now.   Primary hypertension  Continue current therapy with metoprolol succinate for HTN and cardioprotection from CTRCD.     Interval History:   This is a 79-year-old male permanent atrial fibrillation maintained on Xarelto, hypertension, and stage IV leiomyosarcoma of the left lower extremity with metastasis to the liver and spine, initially treated with Doxorubicin 6-8/2023, palliative RT to the spine 10/2023, and on Pazopanib from 06/2024 to 06/2025. When he was found to have progression in the liver, he started Trabectedin with C1D1 06/24/2025.      Patient had a recent admission at [facility] for dyspnea (from June 29 to July 4, 2025) and was noted to have bilateral pleural effusion requiring thoracentesis with drainage of 1 L on the right and about 350 mL on the left.  Cardiology consultation was obtained during this admission.  Patient was also given IV diuretics.  Transthoracic echocardiogram showed normal biventricular systolic function, LVEF 55% with mild to moderate aortic regurgitation, mild to moderate mitral regurgitation, moderate tricuspid regurgitation, mildly dilated ascending aorta 4 cm.     Patient came today as hospital follow-up.  Since hospital discharge, he reported feeling better.  No chest pain or exertional angina.  No PND, no orthopnea.  No worsening of chronic  left pedal edema, no right pedal edema.  He remains asymptomatic from his permanent atrial fibrillation with controlled ventricular rate.     Problem List[1]  Past Medical History[2]  Social History     Socioeconomic History   • Marital status: /Civil Union     Spouse name: Not on file   • Number of children: Not on file   • Years of education: Not on file   • Highest education level: Not on file   Occupational History   • Not on file   Tobacco Use   • Smoking status: Never   • Smokeless tobacco: Never   Vaping Use   • Vaping status: Never Used   Substance and Sexual Activity   • Alcohol use: Yes     Comment: Social    • Drug use: No   • Sexual activity: Not on file   Other Topics Concern   • Not on file   Social History Narrative   • Not on file     Social Drivers of Health     Financial Resource Strain: Low Risk  (2/20/2025)    Received from [employer]    Financial Resource Strain    • Do you have any trouble paying for your medications, or do you think you might in the future?: No    • Does your family have trouble paying for medicine? (Household - for ages 0-17 years): Not on file   Food Insecurity: Patient Declined (6/29/2025)    Nursing - Inadequate Food Risk Classification    • Worried About Running Out of Food in the Last Year: Not on file    • Ran Out of Food in the Last Year: Not on file    • Ran Out of Food in the Last Year: Patient declined   Transportation Needs: Patient Declined (6/29/2025)    Nursing - Transportation Risk Classification    • Lack of Transportation: Not on file    • Lack of Transportation: Patient declined   Physical Activity: Not on file   Stress: Not on file   Social Connections: Socially Integrated (2/20/2025)    Received from [employer]    Social Connections    • How often do you feel lonely or isolated from those around you?: Never   Intimate Partner Violence: Unknown (6/29/2025)    Nursing IPS    • Feels Physically and Emotionally Safe: Not on file    • Physically Hurt by  Someone: Not on file    • Humiliated or Emotionally Abused by Someone: Not on file    • Physically Hurt by Someone: No    • Hurt or Threatened by Someone: No   Housing Stability: Unknown (6/29/2025)    Nursing: Inadequate Housing Risk Classification    • Has Housing: Not on file    • Worried About Losing Housing: Not on file    • Unable to Get Utilities: Not on file    • Unable to Pay for Housing in the Last Year: No    • Has Housing: No      Family History[3]  Past Surgical History[4]  Current Medications[5]  No Known Allergies    Labs:  Lab Results   Component Value Date    K 4.4 07/04/2025    K 4.3 06/24/2025     07/04/2025     (H) 06/24/2025    CO2 25 07/04/2025    CO2 22 06/24/2025    BUN 32 (H) 07/04/2025    BUN 18 06/24/2025    BUN 30 (H) 08/18/2022    CREATININE 1.13 07/04/2025    CREATININE 0.9 06/24/2025    CALCIUM 8.1 (L) 07/04/2025    CALCIUM 8.7 06/24/2025     Lab Results   Component Value Date    WBC 3.68 (L) 07/04/2025    HGB 12.3 07/04/2025    HGB 16.3 03/18/2022    HCT 37.6 07/04/2025     (H) 07/04/2025     07/04/2025       Imaging:   Echo complete w/ contrast if indicated  Result Date: 6/30/2025  Narrative: •  Left Ventricle: Left ventricular cavity size is normal. Wall thickness is increased. There is concentric remodeling. The left ventricular ejection fraction is 55%. Systolic function is normal. Wall motion is normal. •  Right Ventricle: Right ventricular cavity size is normal. Systolic function is normal. •  Left Atrium: The atrium is dilated. •  Right Atrium: The atrium is mildly dilated. •  Aortic Valve: The aortic valve is trileaflet. The leaflets are mildly thickened. The leaflets exhibit normal mobility. There is mild to moderate regurgitation. •  Mitral Valve: There is mild thickening. The leaflets exhibit normal mobility. There is mild to moderate regurgitation. There is no evidence of stenosis. •  Tricuspid Valve: There is moderate regurgitation. The estimated  right ventricular systolic pressure is 37.00 mmHg. •  Aorta: The ascending aorta is mildly dilated. The ascending aorta is 4 cm. •  Pericardium: There is a large left pleural effusion. There is a large right pleural effusion.  There appears to be evidence of atelectasis versus consolidation noted in the left pleural space, clinical correlation is recommended. •  On subcostal imaging, liver lesions are noted-recommend further imaging given history of malignancy Strain was performed to quantify interventricular dyssynchrony and evaluate components of myocardial function due to Chemotherapy,  . Results from the utilization of Strain Analysis are listed in the report below. -14.2% which is reduced.     CTA chest pe study   Result Date: 6/29/2025  Impression: 1. No pulmonary embolism. 2.  Bilateral moderate pleural effusions and associated compressive atelectasis of both lower lobes. 3. Pulmonary nodules as above. These are suspicious for metastatic disease given history. 4. There is suggestion of numerous small ill-defined liver lesions suspicious for metastases. Computerized Assisted Algorithm (CAA) may have aided analysis of applicable images. Resident: Simon Westfall I, the attending radiologist, have reviewed the images and agree with the final report above. Workstation performed: [id number]     US abdomen limited  Result Date: 6/12/2025  Impression: IMPRESSION 1. Ill-defined hypoechoic liver lesions compatible with known metastatic disease.  This is better assessed on 06/09/2025 MRI. 2. Mild ascites.    MRI LIVER W WO CONTRAST  Result Date: 6/11/2025  IMPRESSION: 1.  Increased size and number of multiple small liver metastases compared to 01/27/2025. 2.  Enhancing lesions in the lower thoracic and lumbar spine suspicious for osseous metastases.      Review of Systems:  Review of Systems   Constitutional:  Negative for activity change.   Respiratory:  Negative for shortness of breath.    Cardiovascular:   Positive for leg swelling (chronic LLE edema from prior surgery but no worsening). Negative for chest pain and palpitations.   All other systems reviewed and are negative.    Physical Exam:  Vitals and nursing note reviewed.   Constitutional:       General: not in acute distress.     Appearance: well-developed.   HENT:      Head: Normocephalic and atraumatic.   Neck:     Supple, no JVD, no carotid bruit.  Cardiovascular:      Rate and Rhythm: Normal rate. Rhythm regular.     Heart sounds: No murmur. Normal S1S2, No gallops. No rubs.      +2 chronic left pedal edema. No right pedal edema  Pulmonary:      Effort: Pulmonary effort is normal. No respiratory distress.      Breath sounds: Normal breath sounds.   Abdominal:      Palpations: Abdomen is soft, no distention.     Tenderness: There is no abdominal tenderness.   Neurological:      Mental Status: awake, alert, oriented x 3.     I have spent a total time of 40 minutes in caring for this patient on the day of the visit/encounter including Diagnostic results, Instructions for management, Patient and family education, Impressions, and Counseling / Coordination of care.          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)   • Permanent atrial fibrillation   • Bilateral pleural effusion   • Transaminitis   • PATRICIA (acute kidney injury) (HCC)   • Hypertension   • BPH (benign prostatic hyperplasia)   [2]  Past Medical History:  Diagnosis Date   • BPH (benign prostatic hyperplasia)    • Diverticulitis    [3]  Family History  Problem Relation Name Age of Onset   • Diabetes Father     • Hypertension Father     • Hypertension Mother     • Diabetes Mother     [4]  Past Surgical History:  Procedure Laterality Date   • BOWEL RESECTION     • IR THORACENTESIS  7/3/2025   • IR THORACENTESIS  7/3/2025   [5]    Current Outpatient Medications:   •  atorvastatin (LIPITOR) 10 mg tablet, Take 10 mg by mouth  daily, Disp: , Rfl:   •  finasteride (PROSCAR) 5 mg tablet, Take 1 tablet (5 mg total) by mouth daily, Disp: 90 tablet, Rfl: 3  •  metoprolol succinate (TOPROL-XL) 50 mg 24 hr tablet, Take 1 tablet (50 mg total) by mouth 2 (two) times a day, Disp: , Rfl:   •  multivitamin (THERAGRAN) TABS, Take 1 tablet by mouth, Disp: , Rfl:   •  Psyllium (METAMUCIL FIBER PO), Take by mouth, Disp: , Rfl:   •  rivaroxaban (XARELTO) 20 mg tablet, TAKE 1 TABLET BY MOUTH EVERY DAY WITH DINNER, Disp: , Rfl:

## 2025-07-08 NOTE — ASSESSMENT & PLAN NOTE
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  when he was recently found to have progression in the liver. He started trabectedin with C1D1 06/24/2025.      Since patient is scheduled for cycle 2 on July 12, 2025, close monitoring for cancer treatment related cardiac dysfunction (CTRCD) which are but not limited to ADHF, cardiomyopathy/LV dysfunction, recurrence of pleural effusion, edematous state was advised along with sooner Cardio-Oncology office visit follow up. Patient and wife were instructed to seek immediate medical attention if these develop. They are planning on lodging near [facility] during and few days after cycle 2 of Trabectedin.     Transthoracic echocardiogram on June 30, 2025 showed normal LVEF but with reduced global longitudinal strain (GLS) at -14%.  Continue cardioprotective medication with metoprolol succinate.  Periodic monitoring for CTRCD with serial transthoracic echocardiogram to follow-up on ventricular function with GLS every 2-3 months.    I reached out to patient's Oncologist, Dr. Javier Maki of [facility] and is planning on discussing management including monitoring for CTRCD from Cardio-Oncology standpoint. According to his nursing staff, he will be available on Monday July 14, 2025.

## 2025-07-08 NOTE — TELEPHONE ENCOUNTER
----- Message from Mark Mack MD sent at 7/8/2025  2:41 PM EDT -----  Please arrange for office visit with Dr. Mack in 3 months (OK to overbook). Thank you.

## 2025-07-08 NOTE — ASSESSMENT & PLAN NOTE
Patient had recent hospital admission for symptomatic bilateral pleural effusion requiring thoracenteses.  He is now asymptomatic.  Monitor clinically for recurrence of pleural effusion during and after cycle 2 of Trabectedin with repeat CXR and/or Chest CT if to report dyspnea.

## 2025-07-08 NOTE — ASSESSMENT & PLAN NOTE
Patient has permanent atrial fibrillation with controlled ventricular rate and remains to be asymptomatic.  Continue rate control with metoprolol succinate and long-term oral anticoagulation with Xarelto 20 mg p.o. once daily.

## 2025-07-12 LAB
DSDNA IGG SERPL IA-ACNC: <0.9 IU/ML (ref ?–15)
NUCLEAR IGG SER IA-RTO: <0.09 RATIO (ref ?–1)

## 2025-07-14 ENCOUNTER — APPOINTMENT (OUTPATIENT)
Dept: RADIOLOGY | Facility: CLINIC | Age: 80
End: 2025-07-14
Payer: MEDICARE

## 2025-07-14 DIAGNOSIS — J90 BILATERAL PLEURAL EFFUSION: ICD-10-CM

## 2025-07-14 PROCEDURE — 71046 X-RAY EXAM CHEST 2 VIEWS: CPT

## 2025-07-21 ENCOUNTER — TELEPHONE (OUTPATIENT)
Age: 80
End: 2025-07-21

## 2025-07-21 NOTE — TELEPHONE ENCOUNTER
Received call from pt's wife.  Pt was hospitalized last week at [facility] due to a reaction to a cancer medication.  He developed SOB and swelling in his legs.  When he got to hospital BP was 77/43.  They suspected sepsis.  He was admitted to the cardiac ICU.  He was discharged yesterday, is using oxygen at night due to suspected sleep apnea and pneumonia.  Hospitalist said he was going to reach out to Dr. Mack because of changes seen on ultrasound compared to last hospitalization 6/29 for pleural effusion.  I scheduled pt a hospital f/u 7/30.  Advised I would inform Dr. Mack of hospitalization in case he did not receive message from hospital provider.

## 2025-07-24 ENCOUNTER — APPOINTMENT (OUTPATIENT)
Dept: RADIOLOGY | Facility: CLINIC | Age: 80
End: 2025-07-24
Payer: MEDICARE

## 2025-07-24 DIAGNOSIS — J90 PLEURAL EFFUSION: ICD-10-CM

## 2025-07-24 DIAGNOSIS — I48.91 ATRIAL FIBRILLATION, UNSPECIFIED TYPE (HCC): ICD-10-CM

## 2025-07-24 DIAGNOSIS — C49.22: ICD-10-CM

## 2025-07-24 DIAGNOSIS — N17.9 ACUTE KIDNEY INJURY SUPERIMPOSED ON CKD  (HCC): ICD-10-CM

## 2025-07-24 DIAGNOSIS — N18.9 ACUTE KIDNEY INJURY SUPERIMPOSED ON CKD  (HCC): ICD-10-CM

## 2025-07-24 PROCEDURE — 71046 X-RAY EXAM CHEST 2 VIEWS: CPT

## 2025-07-30 ENCOUNTER — OFFICE VISIT (OUTPATIENT)
Dept: CARDIOLOGY CLINIC | Facility: CLINIC | Age: 80
End: 2025-07-30
Payer: MEDICARE

## 2025-07-30 ENCOUNTER — RESULTS FOLLOW-UP (OUTPATIENT)
Dept: CARDIOLOGY CLINIC | Facility: CLINIC | Age: 80
End: 2025-07-30

## 2025-07-30 ENCOUNTER — APPOINTMENT (OUTPATIENT)
Dept: LAB | Facility: CLINIC | Age: 80
End: 2025-07-30
Attending: INTERNAL MEDICINE
Payer: MEDICARE

## 2025-07-30 VITALS
OXYGEN SATURATION: 98 % | SYSTOLIC BLOOD PRESSURE: 138 MMHG | HEART RATE: 105 BPM | WEIGHT: 183.6 LBS | DIASTOLIC BLOOD PRESSURE: 80 MMHG | BODY MASS INDEX: 27.11 KG/M2

## 2025-07-30 DIAGNOSIS — R06.00 DYSPNEA, UNSPECIFIED TYPE: ICD-10-CM

## 2025-07-30 DIAGNOSIS — I48.21 PERMANENT ATRIAL FIBRILLATION (HCC): ICD-10-CM

## 2025-07-30 DIAGNOSIS — N17.9 AKI (ACUTE KIDNEY INJURY) (HCC): ICD-10-CM

## 2025-07-30 DIAGNOSIS — C78.7 METASTATIC LEIOMYOSARCOMA TO LIVER (HCC): Primary | ICD-10-CM

## 2025-07-30 DIAGNOSIS — C49.9 METASTATIC LEIOMYOSARCOMA TO LIVER (HCC): Primary | ICD-10-CM

## 2025-07-30 LAB
ANION GAP SERPL CALCULATED.3IONS-SCNC: 5 MMOL/L (ref 4–13)
BNP SERPL-MCNC: 404 PG/ML (ref 0–100)
BUN SERPL-MCNC: 27 MG/DL (ref 5–25)
CALCIUM SERPL-MCNC: 8.5 MG/DL (ref 8.4–10.2)
CHLORIDE SERPL-SCNC: 110 MMOL/L (ref 96–108)
CO2 SERPL-SCNC: 25 MMOL/L (ref 21–32)
CREAT SERPL-MCNC: 1.2 MG/DL (ref 0.6–1.3)
GFR SERPL CREATININE-BSD FRML MDRD: 57 ML/MIN/1.73SQ M
GLUCOSE SERPL-MCNC: 121 MG/DL (ref 65–140)
POTASSIUM SERPL-SCNC: 4.8 MMOL/L (ref 3.5–5.3)
SODIUM SERPL-SCNC: 140 MMOL/L (ref 135–147)

## 2025-07-30 PROCEDURE — 36415 COLL VENOUS BLD VENIPUNCTURE: CPT

## 2025-07-30 PROCEDURE — 80048 BASIC METABOLIC PNL TOTAL CA: CPT

## 2025-07-30 PROCEDURE — 83880 ASSAY OF NATRIURETIC PEPTIDE: CPT

## 2025-07-30 PROCEDURE — 99214 OFFICE O/P EST MOD 30 MIN: CPT | Performed by: INTERNAL MEDICINE

## 2025-07-30 RX ORDER — FUROSEMIDE 40 MG/1
40 TABLET ORAL 2 TIMES DAILY
COMMUNITY

## 2025-07-30 RX ORDER — ASPIRIN 81 MG/1
81 TABLET ORAL DAILY
COMMUNITY